# Patient Record
Sex: MALE | URBAN - METROPOLITAN AREA
[De-identification: names, ages, dates, MRNs, and addresses within clinical notes are randomized per-mention and may not be internally consistent; named-entity substitution may affect disease eponyms.]

---

## 2019-01-01 ENCOUNTER — INPATIENT (INPATIENT)
Facility: HOSPITAL | Age: 64
LOS: 3 days | DRG: 64 | End: 2019-08-16
Attending: NEUROLOGICAL SURGERY | Admitting: NEUROLOGICAL SURGERY
Payer: MEDICAID

## 2019-01-01 VITALS
TEMPERATURE: 93 F | OXYGEN SATURATION: 100 % | RESPIRATION RATE: 17 BRPM | SYSTOLIC BLOOD PRESSURE: 132 MMHG | DIASTOLIC BLOOD PRESSURE: 75 MMHG | HEART RATE: 77 BPM

## 2019-01-01 DIAGNOSIS — I61.9 NONTRAUMATIC INTRACEREBRAL HEMORRHAGE, UNSPECIFIED: ICD-10-CM

## 2019-01-01 DIAGNOSIS — R53.2 FUNCTIONAL QUADRIPLEGIA: ICD-10-CM

## 2019-01-01 DIAGNOSIS — Z51.5 ENCOUNTER FOR PALLIATIVE CARE: ICD-10-CM

## 2019-01-01 DIAGNOSIS — J96.00 ACUTE RESPIRATORY FAILURE, UNSPECIFIED WHETHER WITH HYPOXIA OR HYPERCAPNIA: ICD-10-CM

## 2019-01-01 DIAGNOSIS — Z71.89 OTHER SPECIFIED COUNSELING: ICD-10-CM

## 2019-01-01 LAB
ALBUMIN SERPL ELPH-MCNC: 3.3 G/DL — SIGNIFICANT CHANGE UP (ref 3.3–5)
ALBUMIN SERPL ELPH-MCNC: 3.6 G/DL — SIGNIFICANT CHANGE UP (ref 3.3–5)
ALP SERPL-CCNC: 27 U/L — LOW (ref 40–120)
ALP SERPL-CCNC: 32 U/L — LOW (ref 40–120)
ALT FLD-CCNC: 24 U/L — SIGNIFICANT CHANGE UP (ref 10–45)
ALT FLD-CCNC: 26 U/L — SIGNIFICANT CHANGE UP (ref 10–45)
ANION GAP SERPL CALC-SCNC: 10 MMOL/L — SIGNIFICANT CHANGE UP (ref 5–17)
ANION GAP SERPL CALC-SCNC: 11 MMOL/L — SIGNIFICANT CHANGE UP (ref 5–17)
ANION GAP SERPL CALC-SCNC: 11 MMOL/L — SIGNIFICANT CHANGE UP (ref 5–17)
ANION GAP SERPL CALC-SCNC: 12 MMOL/L — SIGNIFICANT CHANGE UP (ref 5–17)
ANION GAP SERPL CALC-SCNC: 12 MMOL/L — SIGNIFICANT CHANGE UP (ref 5–17)
ANION GAP SERPL CALC-SCNC: 13 MMOL/L — SIGNIFICANT CHANGE UP (ref 5–17)
ANION GAP SERPL CALC-SCNC: 14 MMOL/L — SIGNIFICANT CHANGE UP (ref 5–17)
ANION GAP SERPL CALC-SCNC: 14 MMOL/L — SIGNIFICANT CHANGE UP (ref 5–17)
ANION GAP SERPL CALC-SCNC: 17 MMOL/L — SIGNIFICANT CHANGE UP (ref 5–17)
ANION GAP SERPL CALC-SCNC: 18 MMOL/L — HIGH (ref 5–17)
ANION GAP SERPL CALC-SCNC: 7 MMOL/L — SIGNIFICANT CHANGE UP (ref 5–17)
ANION GAP SERPL CALC-SCNC: 8 MMOL/L — SIGNIFICANT CHANGE UP (ref 5–17)
ANION GAP SERPL CALC-SCNC: 9 MMOL/L — SIGNIFICANT CHANGE UP (ref 5–17)
ANISOCYTOSIS BLD QL: SLIGHT — SIGNIFICANT CHANGE UP
APTT BLD: 32.2 SEC — SIGNIFICANT CHANGE UP (ref 27.5–36.3)
AST SERPL-CCNC: 30 U/L — SIGNIFICANT CHANGE UP (ref 10–40)
AST SERPL-CCNC: 49 U/L — HIGH (ref 10–40)
BASE EXCESS BLDA CALC-SCNC: -3.7 MMOL/L — LOW (ref -2–2)
BASE EXCESS BLDA CALC-SCNC: -6.4 MMOL/L — LOW (ref -2–2)
BASE EXCESS BLDV CALC-SCNC: -6.8 MMOL/L — LOW (ref -2–2)
BASOPHILS # BLD AUTO: 0.2 K/UL — SIGNIFICANT CHANGE UP (ref 0–0.2)
BILIRUB SERPL-MCNC: 0.4 MG/DL — SIGNIFICANT CHANGE UP (ref 0.2–1.2)
BILIRUB SERPL-MCNC: 0.7 MG/DL — SIGNIFICANT CHANGE UP (ref 0.2–1.2)
BLD GP AB SCN SERPL QL: NEGATIVE — SIGNIFICANT CHANGE UP
BUN SERPL-MCNC: 10 MG/DL — SIGNIFICANT CHANGE UP (ref 7–23)
BUN SERPL-MCNC: 13 MG/DL — SIGNIFICANT CHANGE UP (ref 7–23)
BUN SERPL-MCNC: 14 MG/DL — SIGNIFICANT CHANGE UP (ref 7–23)
BUN SERPL-MCNC: 15 MG/DL — SIGNIFICANT CHANGE UP (ref 7–23)
BUN SERPL-MCNC: 16 MG/DL — SIGNIFICANT CHANGE UP (ref 7–23)
BUN SERPL-MCNC: 18 MG/DL — SIGNIFICANT CHANGE UP (ref 7–23)
BUN SERPL-MCNC: 19 MG/DL — SIGNIFICANT CHANGE UP (ref 7–23)
BUN SERPL-MCNC: 22 MG/DL — SIGNIFICANT CHANGE UP (ref 7–23)
BUN SERPL-MCNC: 23 MG/DL — SIGNIFICANT CHANGE UP (ref 7–23)
BUN SERPL-MCNC: 23 MG/DL — SIGNIFICANT CHANGE UP (ref 7–23)
BUN SERPL-MCNC: 24 MG/DL — HIGH (ref 7–23)
BUN SERPL-MCNC: 25 MG/DL — HIGH (ref 7–23)
CA-I SERPL-SCNC: 1.16 MMOL/L — SIGNIFICANT CHANGE UP (ref 1.12–1.3)
CALCIUM SERPL-MCNC: 5.9 MG/DL — CRITICAL LOW (ref 8.4–10.5)
CALCIUM SERPL-MCNC: 7.8 MG/DL — LOW (ref 8.4–10.5)
CALCIUM SERPL-MCNC: 8.2 MG/DL — LOW (ref 8.4–10.5)
CALCIUM SERPL-MCNC: 8.2 MG/DL — LOW (ref 8.4–10.5)
CALCIUM SERPL-MCNC: 8.3 MG/DL — LOW (ref 8.4–10.5)
CALCIUM SERPL-MCNC: 8.4 MG/DL — SIGNIFICANT CHANGE UP (ref 8.4–10.5)
CALCIUM SERPL-MCNC: 8.4 MG/DL — SIGNIFICANT CHANGE UP (ref 8.4–10.5)
CALCIUM SERPL-MCNC: 8.8 MG/DL — SIGNIFICANT CHANGE UP (ref 8.4–10.5)
CALCIUM SERPL-MCNC: 8.9 MG/DL — SIGNIFICANT CHANGE UP (ref 8.4–10.5)
CALCIUM SERPL-MCNC: 8.9 MG/DL — SIGNIFICANT CHANGE UP (ref 8.4–10.5)
CALCIUM SERPL-MCNC: 9 MG/DL — SIGNIFICANT CHANGE UP (ref 8.4–10.5)
CALCIUM SERPL-MCNC: 9.2 MG/DL — SIGNIFICANT CHANGE UP (ref 8.4–10.5)
CALCIUM SERPL-MCNC: 9.3 MG/DL — SIGNIFICANT CHANGE UP (ref 8.4–10.5)
CHLORIDE BLDV-SCNC: 113 MMOL/L — HIGH (ref 96–108)
CHLORIDE SERPL-SCNC: 107 MMOL/L — SIGNIFICANT CHANGE UP (ref 96–108)
CHLORIDE SERPL-SCNC: 108 MMOL/L — SIGNIFICANT CHANGE UP (ref 96–108)
CHLORIDE SERPL-SCNC: 121 MMOL/L — HIGH (ref 96–108)
CHLORIDE SERPL-SCNC: 122 MMOL/L — HIGH (ref 96–108)
CHLORIDE SERPL-SCNC: 123 MMOL/L — HIGH (ref 96–108)
CHLORIDE SERPL-SCNC: 126 MMOL/L — HIGH (ref 96–108)
CHLORIDE SERPL-SCNC: 127 MMOL/L — HIGH (ref 96–108)
CHLORIDE SERPL-SCNC: 129 MMOL/L — HIGH (ref 96–108)
CHLORIDE SERPL-SCNC: 132 MMOL/L — HIGH (ref 96–108)
CHLORIDE SERPL-SCNC: 133 MMOL/L — HIGH (ref 96–108)
CHLORIDE SERPL-SCNC: 133 MMOL/L — HIGH (ref 96–108)
CHLORIDE SERPL-SCNC: >135 MMOL/L — HIGH (ref 96–108)
CHLORIDE SERPL-SCNC: >135 MMOL/L — HIGH (ref 96–108)
CO2 BLDA-SCNC: 20 MMOL/L — LOW (ref 22–30)
CO2 BLDA-SCNC: 21 MMOL/L — LOW (ref 22–30)
CO2 BLDV-SCNC: 21 MMOL/L — LOW (ref 22–30)
CO2 SERPL-SCNC: 11 MMOL/L — LOW (ref 22–31)
CO2 SERPL-SCNC: 15 MMOL/L — LOW (ref 22–31)
CO2 SERPL-SCNC: 16 MMOL/L — LOW (ref 22–31)
CO2 SERPL-SCNC: 17 MMOL/L — LOW (ref 22–31)
CO2 SERPL-SCNC: 18 MMOL/L — LOW (ref 22–31)
CO2 SERPL-SCNC: 19 MMOL/L — LOW (ref 22–31)
CO2 SERPL-SCNC: 20 MMOL/L — LOW (ref 22–31)
CREAT SERPL-MCNC: 1.69 MG/DL — HIGH (ref 0.5–1.3)
CREAT SERPL-MCNC: 1.85 MG/DL — HIGH (ref 0.5–1.3)
CREAT SERPL-MCNC: 2.29 MG/DL — HIGH (ref 0.5–1.3)
CREAT SERPL-MCNC: 2.33 MG/DL — HIGH (ref 0.5–1.3)
CREAT SERPL-MCNC: 2.41 MG/DL — HIGH (ref 0.5–1.3)
CREAT SERPL-MCNC: 2.42 MG/DL — HIGH (ref 0.5–1.3)
CREAT SERPL-MCNC: 2.61 MG/DL — HIGH (ref 0.5–1.3)
CREAT SERPL-MCNC: 2.97 MG/DL — HIGH (ref 0.5–1.3)
CREAT SERPL-MCNC: 3.01 MG/DL — HIGH (ref 0.5–1.3)
CREAT SERPL-MCNC: 3.06 MG/DL — HIGH (ref 0.5–1.3)
CREAT SERPL-MCNC: 3.1 MG/DL — HIGH (ref 0.5–1.3)
CREAT SERPL-MCNC: 3.16 MG/DL — HIGH (ref 0.5–1.3)
CREAT SERPL-MCNC: 3.17 MG/DL — HIGH (ref 0.5–1.3)
CREAT SERPL-MCNC: 3.2 MG/DL — HIGH (ref 0.5–1.3)
CREAT SERPL-MCNC: 3.27 MG/DL — HIGH (ref 0.5–1.3)
EOSINOPHIL # BLD AUTO: 0 K/UL — SIGNIFICANT CHANGE UP (ref 0–0.5)
GAS PNL BLDV: 138 MMOL/L — SIGNIFICANT CHANGE UP (ref 135–145)
GAS PNL BLDV: SIGNIFICANT CHANGE UP
GAS PNL BLDV: SIGNIFICANT CHANGE UP
GLUCOSE BLDC GLUCOMTR-MCNC: 104 MG/DL — HIGH (ref 70–99)
GLUCOSE BLDC GLUCOMTR-MCNC: 105 MG/DL — HIGH (ref 70–99)
GLUCOSE BLDC GLUCOMTR-MCNC: 112 MG/DL — HIGH (ref 70–99)
GLUCOSE BLDC GLUCOMTR-MCNC: 120 MG/DL — HIGH (ref 70–99)
GLUCOSE BLDC GLUCOMTR-MCNC: 120 MG/DL — HIGH (ref 70–99)
GLUCOSE BLDC GLUCOMTR-MCNC: 123 MG/DL — HIGH (ref 70–99)
GLUCOSE BLDC GLUCOMTR-MCNC: 129 MG/DL — HIGH (ref 70–99)
GLUCOSE BLDC GLUCOMTR-MCNC: 134 MG/DL — HIGH (ref 70–99)
GLUCOSE BLDC GLUCOMTR-MCNC: 135 MG/DL — HIGH (ref 70–99)
GLUCOSE BLDC GLUCOMTR-MCNC: 140 MG/DL — HIGH (ref 70–99)
GLUCOSE BLDC GLUCOMTR-MCNC: 141 MG/DL — HIGH (ref 70–99)
GLUCOSE BLDC GLUCOMTR-MCNC: 141 MG/DL — HIGH (ref 70–99)
GLUCOSE BLDC GLUCOMTR-MCNC: 142 MG/DL — HIGH (ref 70–99)
GLUCOSE BLDC GLUCOMTR-MCNC: 151 MG/DL — HIGH (ref 70–99)
GLUCOSE BLDC GLUCOMTR-MCNC: 153 MG/DL — HIGH (ref 70–99)
GLUCOSE BLDC GLUCOMTR-MCNC: 155 MG/DL — HIGH (ref 70–99)
GLUCOSE BLDC GLUCOMTR-MCNC: 161 MG/DL — HIGH (ref 70–99)
GLUCOSE BLDC GLUCOMTR-MCNC: 162 MG/DL — HIGH (ref 70–99)
GLUCOSE BLDC GLUCOMTR-MCNC: 164 MG/DL — HIGH (ref 70–99)
GLUCOSE BLDC GLUCOMTR-MCNC: 165 MG/DL — HIGH (ref 70–99)
GLUCOSE BLDC GLUCOMTR-MCNC: 169 MG/DL — HIGH (ref 70–99)
GLUCOSE BLDC GLUCOMTR-MCNC: 174 MG/DL — HIGH (ref 70–99)
GLUCOSE BLDC GLUCOMTR-MCNC: 175 MG/DL — HIGH (ref 70–99)
GLUCOSE BLDC GLUCOMTR-MCNC: 176 MG/DL — HIGH (ref 70–99)
GLUCOSE BLDC GLUCOMTR-MCNC: 179 MG/DL — HIGH (ref 70–99)
GLUCOSE BLDC GLUCOMTR-MCNC: 180 MG/DL — HIGH (ref 70–99)
GLUCOSE BLDC GLUCOMTR-MCNC: 180 MG/DL — HIGH (ref 70–99)
GLUCOSE BLDC GLUCOMTR-MCNC: 182 MG/DL — HIGH (ref 70–99)
GLUCOSE BLDC GLUCOMTR-MCNC: 192 MG/DL — HIGH (ref 70–99)
GLUCOSE BLDC GLUCOMTR-MCNC: 192 MG/DL — HIGH (ref 70–99)
GLUCOSE BLDC GLUCOMTR-MCNC: 195 MG/DL — HIGH (ref 70–99)
GLUCOSE BLDC GLUCOMTR-MCNC: 200 MG/DL — HIGH (ref 70–99)
GLUCOSE BLDC GLUCOMTR-MCNC: 201 MG/DL — HIGH (ref 70–99)
GLUCOSE BLDC GLUCOMTR-MCNC: 208 MG/DL — HIGH (ref 70–99)
GLUCOSE BLDC GLUCOMTR-MCNC: 210 MG/DL — HIGH (ref 70–99)
GLUCOSE BLDC GLUCOMTR-MCNC: 227 MG/DL — HIGH (ref 70–99)
GLUCOSE BLDC GLUCOMTR-MCNC: 237 MG/DL — HIGH (ref 70–99)
GLUCOSE BLDC GLUCOMTR-MCNC: 87 MG/DL — SIGNIFICANT CHANGE UP (ref 70–99)
GLUCOSE BLDC GLUCOMTR-MCNC: 88 MG/DL — SIGNIFICANT CHANGE UP (ref 70–99)
GLUCOSE BLDC GLUCOMTR-MCNC: 92 MG/DL — SIGNIFICANT CHANGE UP (ref 70–99)
GLUCOSE BLDC GLUCOMTR-MCNC: 96 MG/DL — SIGNIFICANT CHANGE UP (ref 70–99)
GLUCOSE BLDV-MCNC: 385 MG/DL — HIGH (ref 70–99)
GLUCOSE SERPL-MCNC: 108 MG/DL — HIGH (ref 70–99)
GLUCOSE SERPL-MCNC: 119 MG/DL — HIGH (ref 70–99)
GLUCOSE SERPL-MCNC: 140 MG/DL — HIGH (ref 70–99)
GLUCOSE SERPL-MCNC: 143 MG/DL — HIGH (ref 70–99)
GLUCOSE SERPL-MCNC: 150 MG/DL — HIGH (ref 70–99)
GLUCOSE SERPL-MCNC: 154 MG/DL — HIGH (ref 70–99)
GLUCOSE SERPL-MCNC: 159 MG/DL — HIGH (ref 70–99)
GLUCOSE SERPL-MCNC: 188 MG/DL — HIGH (ref 70–99)
GLUCOSE SERPL-MCNC: 204 MG/DL — HIGH (ref 70–99)
GLUCOSE SERPL-MCNC: 206 MG/DL — HIGH (ref 70–99)
GLUCOSE SERPL-MCNC: 224 MG/DL — HIGH (ref 70–99)
GLUCOSE SERPL-MCNC: 254 MG/DL — HIGH (ref 70–99)
GLUCOSE SERPL-MCNC: 274 MG/DL — HIGH (ref 70–99)
GLUCOSE SERPL-MCNC: 397 MG/DL — HIGH (ref 70–99)
GLUCOSE SERPL-MCNC: 97 MG/DL — SIGNIFICANT CHANGE UP (ref 70–99)
HBA1C BLD-MCNC: 6 % — HIGH (ref 4–5.6)
HCO3 BLDA-SCNC: 18 MMOL/L — LOW (ref 21–29)
HCO3 BLDA-SCNC: 20 MMOL/L — LOW (ref 21–29)
HCO3 BLDV-SCNC: 20 MMOL/L — LOW (ref 21–29)
HCT VFR BLD CALC: 24.9 % — LOW (ref 39–50)
HCT VFR BLD CALC: 26.5 % — LOW (ref 39–50)
HCT VFR BLD CALC: 27.3 % — LOW (ref 39–50)
HCT VFR BLD CALC: 27.7 % — LOW (ref 39–50)
HCT VFR BLD CALC: 27.7 % — LOW (ref 39–50)
HCT VFR BLD CALC: 28 % — LOW (ref 39–50)
HCT VFR BLDA CALC: 27 % — LOW (ref 39–50)
HCV AB S/CO SERPL IA: 0.13 S/CO — SIGNIFICANT CHANGE UP (ref 0–0.99)
HCV AB SERPL-IMP: SIGNIFICANT CHANGE UP
HGB BLD CALC-MCNC: 8.8 G/DL — LOW (ref 13–17)
HGB BLD-MCNC: 7.5 G/DL — LOW (ref 13–17)
HGB BLD-MCNC: 7.6 G/DL — LOW (ref 13–17)
HGB BLD-MCNC: 8 G/DL — LOW (ref 13–17)
HGB BLD-MCNC: 8.8 G/DL — LOW (ref 13–17)
HGB BLD-MCNC: 8.8 G/DL — LOW (ref 13–17)
HGB BLD-MCNC: 9.1 G/DL — LOW (ref 13–17)
HOROWITZ INDEX BLDA+IHG-RTO: 40 — SIGNIFICANT CHANGE UP
HOROWITZ INDEX BLDA+IHG-RTO: 40 — SIGNIFICANT CHANGE UP
HYPOCHROMIA BLD QL: SIGNIFICANT CHANGE UP
INR BLD: 1 RATIO — SIGNIFICANT CHANGE UP (ref 0.88–1.16)
INR BLD: 1.08 RATIO — SIGNIFICANT CHANGE UP (ref 0.88–1.16)
INR BLD: 1.25 RATIO — HIGH (ref 0.88–1.16)
LACTATE BLDV-MCNC: 3.8 MMOL/L — HIGH (ref 0.7–2)
LYMPHOCYTES # BLD AUTO: 1.1 K/UL — SIGNIFICANT CHANGE UP (ref 1–3.3)
LYMPHOCYTES # BLD AUTO: 6 % — LOW (ref 13–44)
MAGNESIUM SERPL-MCNC: 1.5 MG/DL — LOW (ref 1.6–2.6)
MAGNESIUM SERPL-MCNC: 1.9 MG/DL — SIGNIFICANT CHANGE UP (ref 1.6–2.6)
MAGNESIUM SERPL-MCNC: 1.9 MG/DL — SIGNIFICANT CHANGE UP (ref 1.6–2.6)
MAGNESIUM SERPL-MCNC: 2.1 MG/DL — SIGNIFICANT CHANGE UP (ref 1.6–2.6)
MAGNESIUM SERPL-MCNC: 2.4 MG/DL — SIGNIFICANT CHANGE UP (ref 1.6–2.6)
MAGNESIUM SERPL-MCNC: 2.6 MG/DL — SIGNIFICANT CHANGE UP (ref 1.6–2.6)
MAGNESIUM SERPL-MCNC: 2.7 MG/DL — HIGH (ref 1.6–2.6)
MAGNESIUM SERPL-MCNC: 2.9 MG/DL — HIGH (ref 1.6–2.6)
MAGNESIUM SERPL-MCNC: 3 MG/DL — HIGH (ref 1.6–2.6)
MCHC RBC-ENTMCNC: 20.1 PG — LOW (ref 27–34)
MCHC RBC-ENTMCNC: 20.3 PG — LOW (ref 27–34)
MCHC RBC-ENTMCNC: 21.8 PG — LOW (ref 27–34)
MCHC RBC-ENTMCNC: 22.3 PG — LOW (ref 27–34)
MCHC RBC-ENTMCNC: 22.5 PG — LOW (ref 27–34)
MCHC RBC-ENTMCNC: 23.1 PG — LOW (ref 27–34)
MCHC RBC-ENTMCNC: 28.2 GM/DL — LOW (ref 32–36)
MCHC RBC-ENTMCNC: 28.7 GM/DL — LOW (ref 32–36)
MCHC RBC-ENTMCNC: 30.7 GM/DL — LOW (ref 32–36)
MCHC RBC-ENTMCNC: 31.6 GM/DL — LOW (ref 32–36)
MCHC RBC-ENTMCNC: 31.7 GM/DL — LOW (ref 32–36)
MCHC RBC-ENTMCNC: 33.2 GM/DL — SIGNIFICANT CHANGE UP (ref 32–36)
MCV RBC AUTO: 69.6 FL — LOW (ref 80–100)
MCV RBC AUTO: 70.5 FL — LOW (ref 80–100)
MCV RBC AUTO: 70.7 FL — LOW (ref 80–100)
MCV RBC AUTO: 70.8 FL — LOW (ref 80–100)
MCV RBC AUTO: 71 FL — LOW (ref 80–100)
MCV RBC AUTO: 71.2 FL — LOW (ref 80–100)
MICROCYTES BLD QL: SLIGHT — SIGNIFICANT CHANGE UP
MONOCYTES # BLD AUTO: 0.6 K/UL — SIGNIFICANT CHANGE UP (ref 0–0.9)
MONOCYTES NFR BLD AUTO: 4 % — SIGNIFICANT CHANGE UP (ref 2–14)
NEUTROPHILS # BLD AUTO: 12.9 K/UL — HIGH (ref 1.8–7.4)
NEUTROPHILS NFR BLD AUTO: 85 % — HIGH (ref 43–77)
NEUTS BAND # BLD: 5 % — SIGNIFICANT CHANGE UP (ref 0–8)
OTHER CELLS CSF MANUAL: 12 ML/DL — LOW (ref 18–22)
PCO2 BLDA: 33 MMHG — SIGNIFICANT CHANGE UP (ref 32–46)
PCO2 BLDA: 36 MMHG — SIGNIFICANT CHANGE UP (ref 32–46)
PCO2 BLDV: 48 MMHG — SIGNIFICANT CHANGE UP (ref 35–50)
PH BLDA: 7.33 — LOW (ref 7.35–7.45)
PH BLDA: 7.4 — SIGNIFICANT CHANGE UP (ref 7.35–7.45)
PH BLDV: 7.24 — LOW (ref 7.35–7.45)
PHOSPHATE SERPL-MCNC: 0.8 MG/DL — CRITICAL LOW (ref 2.5–4.5)
PHOSPHATE SERPL-MCNC: 1 MG/DL — CRITICAL LOW (ref 2.5–4.5)
PHOSPHATE SERPL-MCNC: 1.5 MG/DL — LOW (ref 2.5–4.5)
PHOSPHATE SERPL-MCNC: 3.8 MG/DL — SIGNIFICANT CHANGE UP (ref 2.5–4.5)
PHOSPHATE SERPL-MCNC: 4.1 MG/DL — SIGNIFICANT CHANGE UP (ref 2.5–4.5)
PHOSPHATE SERPL-MCNC: 4.3 MG/DL — SIGNIFICANT CHANGE UP (ref 2.5–4.5)
PHOSPHATE SERPL-MCNC: 4.6 MG/DL — HIGH (ref 2.5–4.5)
PHOSPHATE SERPL-MCNC: 4.7 MG/DL — HIGH (ref 2.5–4.5)
PHOSPHATE SERPL-MCNC: 5.3 MG/DL — HIGH (ref 2.5–4.5)
PHOSPHATE SERPL-MCNC: 5.4 MG/DL — HIGH (ref 2.5–4.5)
PHOSPHATE SERPL-MCNC: 5.5 MG/DL — HIGH (ref 2.5–4.5)
PHOSPHATE SERPL-MCNC: 5.8 MG/DL — HIGH (ref 2.5–4.5)
PLAT MORPH BLD: NORMAL — SIGNIFICANT CHANGE UP
PLATELET # BLD AUTO: 206 K/UL — SIGNIFICANT CHANGE UP (ref 150–400)
PLATELET # BLD AUTO: 228 K/UL — SIGNIFICANT CHANGE UP (ref 150–400)
PLATELET # BLD AUTO: 257 K/UL — SIGNIFICANT CHANGE UP (ref 150–400)
PLATELET # BLD AUTO: 267 K/UL — SIGNIFICANT CHANGE UP (ref 150–400)
PLATELET # BLD AUTO: 275 K/UL — SIGNIFICANT CHANGE UP (ref 150–400)
PLATELET # BLD AUTO: 295 K/UL — SIGNIFICANT CHANGE UP (ref 150–400)
PO2 BLDA: 173 MMHG — HIGH (ref 74–108)
PO2 BLDA: 199 MMHG — HIGH (ref 74–108)
PO2 BLDV: 161 MMHG — HIGH (ref 25–45)
POIKILOCYTOSIS BLD QL AUTO: SLIGHT — SIGNIFICANT CHANGE UP
POTASSIUM BLDV-SCNC: 3.2 MMOL/L — LOW (ref 3.5–5.3)
POTASSIUM SERPL-MCNC: 3.3 MMOL/L — LOW (ref 3.5–5.3)
POTASSIUM SERPL-MCNC: 3.4 MMOL/L — LOW (ref 3.5–5.3)
POTASSIUM SERPL-MCNC: 3.7 MMOL/L — SIGNIFICANT CHANGE UP (ref 3.5–5.3)
POTASSIUM SERPL-MCNC: 4 MMOL/L — SIGNIFICANT CHANGE UP (ref 3.5–5.3)
POTASSIUM SERPL-MCNC: 4.1 MMOL/L — SIGNIFICANT CHANGE UP (ref 3.5–5.3)
POTASSIUM SERPL-MCNC: 4.2 MMOL/L — SIGNIFICANT CHANGE UP (ref 3.5–5.3)
POTASSIUM SERPL-MCNC: 4.2 MMOL/L — SIGNIFICANT CHANGE UP (ref 3.5–5.3)
POTASSIUM SERPL-MCNC: 4.4 MMOL/L — SIGNIFICANT CHANGE UP (ref 3.5–5.3)
POTASSIUM SERPL-MCNC: 4.8 MMOL/L — SIGNIFICANT CHANGE UP (ref 3.5–5.3)
POTASSIUM SERPL-MCNC: 5.4 MMOL/L — HIGH (ref 3.5–5.3)
POTASSIUM SERPL-MCNC: 5.6 MMOL/L — HIGH (ref 3.5–5.3)
POTASSIUM SERPL-MCNC: 5.8 MMOL/L — HIGH (ref 3.5–5.3)
POTASSIUM SERPL-MCNC: 8.6 MMOL/L — CRITICAL HIGH (ref 3.5–5.3)
POTASSIUM SERPL-SCNC: 3.3 MMOL/L — LOW (ref 3.5–5.3)
POTASSIUM SERPL-SCNC: 3.4 MMOL/L — LOW (ref 3.5–5.3)
POTASSIUM SERPL-SCNC: 3.7 MMOL/L — SIGNIFICANT CHANGE UP (ref 3.5–5.3)
POTASSIUM SERPL-SCNC: 4 MMOL/L — SIGNIFICANT CHANGE UP (ref 3.5–5.3)
POTASSIUM SERPL-SCNC: 4.1 MMOL/L — SIGNIFICANT CHANGE UP (ref 3.5–5.3)
POTASSIUM SERPL-SCNC: 4.2 MMOL/L — SIGNIFICANT CHANGE UP (ref 3.5–5.3)
POTASSIUM SERPL-SCNC: 4.2 MMOL/L — SIGNIFICANT CHANGE UP (ref 3.5–5.3)
POTASSIUM SERPL-SCNC: 4.4 MMOL/L — SIGNIFICANT CHANGE UP (ref 3.5–5.3)
POTASSIUM SERPL-SCNC: 4.8 MMOL/L — SIGNIFICANT CHANGE UP (ref 3.5–5.3)
POTASSIUM SERPL-SCNC: 5.4 MMOL/L — HIGH (ref 3.5–5.3)
POTASSIUM SERPL-SCNC: 5.6 MMOL/L — HIGH (ref 3.5–5.3)
POTASSIUM SERPL-SCNC: 5.8 MMOL/L — HIGH (ref 3.5–5.3)
POTASSIUM SERPL-SCNC: 8.6 MMOL/L — CRITICAL HIGH (ref 3.5–5.3)
PROT SERPL-MCNC: 6 G/DL — SIGNIFICANT CHANGE UP (ref 6–8.3)
PROT SERPL-MCNC: 6.3 G/DL — SIGNIFICANT CHANGE UP (ref 6–8.3)
PROTHROM AB SERPL-ACNC: 11.5 SEC — SIGNIFICANT CHANGE UP (ref 10–12.9)
PROTHROM AB SERPL-ACNC: 12.4 SEC — SIGNIFICANT CHANGE UP (ref 10–12.9)
PROTHROM AB SERPL-ACNC: 14.4 SEC — HIGH (ref 10–12.9)
RBC # BLD: 3.51 M/UL — LOW (ref 4.2–5.8)
RBC # BLD: 3.72 M/UL — LOW (ref 4.2–5.8)
RBC # BLD: 3.91 M/UL — LOW (ref 4.2–5.8)
RBC # BLD: 3.92 M/UL — LOW (ref 4.2–5.8)
RBC # BLD: 3.93 M/UL — LOW (ref 4.2–5.8)
RBC # BLD: 3.96 M/UL — LOW (ref 4.2–5.8)
RBC # FLD: 15.5 % — HIGH (ref 10.3–14.5)
RBC # FLD: 15.7 % — HIGH (ref 10.3–14.5)
RBC # FLD: 15.9 % — HIGH (ref 10.3–14.5)
RBC # FLD: 16.2 % — HIGH (ref 10.3–14.5)
RBC # FLD: 16.3 % — HIGH (ref 10.3–14.5)
RBC # FLD: 16.5 % — HIGH (ref 10.3–14.5)
RBC BLD AUTO: ABNORMAL
RH IG SCN BLD-IMP: POSITIVE — SIGNIFICANT CHANGE UP
RH IG SCN BLD-IMP: POSITIVE — SIGNIFICANT CHANGE UP
SAO2 % BLDA: 99 % — HIGH (ref 92–96)
SAO2 % BLDA: 99 % — HIGH (ref 92–96)
SAO2 % BLDV: 98 % — HIGH (ref 67–88)
SODIUM SERPL-SCNC: 143 MMOL/L — SIGNIFICANT CHANGE UP (ref 135–145)
SODIUM SERPL-SCNC: 144 MMOL/L — SIGNIFICANT CHANGE UP (ref 135–145)
SODIUM SERPL-SCNC: 149 MMOL/L — HIGH (ref 135–145)
SODIUM SERPL-SCNC: 149 MMOL/L — HIGH (ref 135–145)
SODIUM SERPL-SCNC: 151 MMOL/L — HIGH (ref 135–145)
SODIUM SERPL-SCNC: 152 MMOL/L — HIGH (ref 135–145)
SODIUM SERPL-SCNC: 152 MMOL/L — HIGH (ref 135–145)
SODIUM SERPL-SCNC: 153 MMOL/L — HIGH (ref 135–145)
SODIUM SERPL-SCNC: 154 MMOL/L — HIGH (ref 135–145)
SODIUM SERPL-SCNC: 157 MMOL/L — HIGH (ref 135–145)
SODIUM SERPL-SCNC: 160 MMOL/L — CRITICAL HIGH (ref 135–145)
SODIUM SERPL-SCNC: 160 MMOL/L — CRITICAL HIGH (ref 135–145)
SODIUM SERPL-SCNC: 162 MMOL/L — CRITICAL HIGH (ref 135–145)
SODIUM SERPL-SCNC: 163 MMOL/L — CRITICAL HIGH (ref 135–145)
SODIUM SERPL-SCNC: 167 MMOL/L — CRITICAL HIGH (ref 135–145)
TARGETS BLD QL SMEAR: SLIGHT — SIGNIFICANT CHANGE UP
TROPONIN T, HIGH SENSITIVITY RESULT: 45 NG/L — SIGNIFICANT CHANGE UP (ref 0–51)
WBC # BLD: 14.7 K/UL — HIGH (ref 3.8–10.5)
WBC # BLD: 14.8 K/UL — HIGH (ref 3.8–10.5)
WBC # BLD: 17.2 K/UL — HIGH (ref 3.8–10.5)
WBC # BLD: 17.7 K/UL — HIGH (ref 3.8–10.5)
WBC # BLD: 18.6 K/UL — HIGH (ref 3.8–10.5)
WBC # BLD: 18.8 K/UL — HIGH (ref 3.8–10.5)
WBC # FLD AUTO: 14.7 K/UL — HIGH (ref 3.8–10.5)
WBC # FLD AUTO: 14.8 K/UL — HIGH (ref 3.8–10.5)
WBC # FLD AUTO: 17.2 K/UL — HIGH (ref 3.8–10.5)
WBC # FLD AUTO: 17.7 K/UL — HIGH (ref 3.8–10.5)
WBC # FLD AUTO: 18.6 K/UL — HIGH (ref 3.8–10.5)
WBC # FLD AUTO: 18.8 K/UL — HIGH (ref 3.8–10.5)

## 2019-01-01 PROCEDURE — 99233 SBSQ HOSP IP/OBS HIGH 50: CPT

## 2019-01-01 PROCEDURE — 70450 CT HEAD/BRAIN W/O DYE: CPT

## 2019-01-01 PROCEDURE — 86803 HEPATITIS C AB TEST: CPT

## 2019-01-01 PROCEDURE — 80053 COMPREHEN METABOLIC PANEL: CPT

## 2019-01-01 PROCEDURE — 83036 HEMOGLOBIN GLYCOSYLATED A1C: CPT

## 2019-01-01 PROCEDURE — 71045 X-RAY EXAM CHEST 1 VIEW: CPT

## 2019-01-01 PROCEDURE — 93970 EXTREMITY STUDY: CPT

## 2019-01-01 PROCEDURE — 93010 ELECTROCARDIOGRAM REPORT: CPT

## 2019-01-01 PROCEDURE — 99291 CRITICAL CARE FIRST HOUR: CPT

## 2019-01-01 PROCEDURE — 82330 ASSAY OF CALCIUM: CPT

## 2019-01-01 PROCEDURE — 70450 CT HEAD/BRAIN W/O DYE: CPT | Mod: 26

## 2019-01-01 PROCEDURE — 80048 BASIC METABOLIC PNL TOTAL CA: CPT

## 2019-01-01 PROCEDURE — 82803 BLOOD GASES ANY COMBINATION: CPT

## 2019-01-01 PROCEDURE — 93005 ELECTROCARDIOGRAM TRACING: CPT

## 2019-01-01 PROCEDURE — 93970 EXTREMITY STUDY: CPT | Mod: 26

## 2019-01-01 PROCEDURE — 94002 VENT MGMT INPAT INIT DAY: CPT

## 2019-01-01 PROCEDURE — 85730 THROMBOPLASTIN TIME PARTIAL: CPT

## 2019-01-01 PROCEDURE — 84295 ASSAY OF SERUM SODIUM: CPT

## 2019-01-01 PROCEDURE — 85027 COMPLETE CBC AUTOMATED: CPT

## 2019-01-01 PROCEDURE — 99223 1ST HOSP IP/OBS HIGH 75: CPT

## 2019-01-01 PROCEDURE — 85014 HEMATOCRIT: CPT

## 2019-01-01 PROCEDURE — 99285 EMERGENCY DEPT VISIT HI MDM: CPT | Mod: 25

## 2019-01-01 PROCEDURE — 83735 ASSAY OF MAGNESIUM: CPT

## 2019-01-01 PROCEDURE — 71045 X-RAY EXAM CHEST 1 VIEW: CPT | Mod: 26

## 2019-01-01 PROCEDURE — 85610 PROTHROMBIN TIME: CPT

## 2019-01-01 PROCEDURE — 86900 BLOOD TYPING SEROLOGIC ABO: CPT

## 2019-01-01 PROCEDURE — 82435 ASSAY OF BLOOD CHLORIDE: CPT

## 2019-01-01 PROCEDURE — 96374 THER/PROPH/DIAG INJ IV PUSH: CPT

## 2019-01-01 PROCEDURE — 84484 ASSAY OF TROPONIN QUANT: CPT

## 2019-01-01 PROCEDURE — 86901 BLOOD TYPING SEROLOGIC RH(D): CPT

## 2019-01-01 PROCEDURE — 83605 ASSAY OF LACTIC ACID: CPT

## 2019-01-01 PROCEDURE — 84100 ASSAY OF PHOSPHORUS: CPT

## 2019-01-01 PROCEDURE — 86850 RBC ANTIBODY SCREEN: CPT

## 2019-01-01 PROCEDURE — 84132 ASSAY OF SERUM POTASSIUM: CPT

## 2019-01-01 PROCEDURE — 82947 ASSAY GLUCOSE BLOOD QUANT: CPT

## 2019-01-01 PROCEDURE — 82962 GLUCOSE BLOOD TEST: CPT

## 2019-01-01 PROCEDURE — 99497 ADVNCD CARE PLAN 30 MIN: CPT | Mod: 25

## 2019-01-01 PROCEDURE — 94003 VENT MGMT INPAT SUBQ DAY: CPT

## 2019-01-01 PROCEDURE — 99498 ADVNCD CARE PLAN ADDL 30 MIN: CPT | Mod: 25

## 2019-01-01 RX ORDER — INSULIN HUMAN 100 [IU]/ML
2 INJECTION, SOLUTION SUBCUTANEOUS
Qty: 100 | Refills: 0 | Status: DISCONTINUED | OUTPATIENT
Start: 2019-01-01 | End: 2019-01-01

## 2019-01-01 RX ORDER — PANTOPRAZOLE SODIUM 20 MG/1
40 TABLET, DELAYED RELEASE ORAL DAILY
Refills: 0 | Status: DISCONTINUED | OUTPATIENT
Start: 2019-01-01 | End: 2019-01-01

## 2019-01-01 RX ORDER — MAGNESIUM SULFATE 500 MG/ML
2 VIAL (ML) INJECTION ONCE
Refills: 0 | Status: COMPLETED | OUTPATIENT
Start: 2019-01-01 | End: 2019-01-01

## 2019-01-01 RX ORDER — PHENYLEPHRINE HYDROCHLORIDE 10 MG/ML
0.1 INJECTION INTRAVENOUS
Qty: 40 | Refills: 0 | Status: DISCONTINUED | OUTPATIENT
Start: 2019-01-01 | End: 2019-01-01

## 2019-01-01 RX ORDER — ACETAMINOPHEN 500 MG
650 TABLET ORAL EVERY 6 HOURS
Refills: 0 | Status: DISCONTINUED | OUTPATIENT
Start: 2019-01-01 | End: 2019-01-01

## 2019-01-01 RX ORDER — POTASSIUM CHLORIDE 20 MEQ
40 PACKET (EA) ORAL
Refills: 0 | Status: COMPLETED | OUTPATIENT
Start: 2019-01-01 | End: 2019-01-01

## 2019-01-01 RX ORDER — SODIUM CHLORIDE 9 MG/ML
1000 INJECTION, SOLUTION INTRAVENOUS
Refills: 0 | Status: DISCONTINUED | OUTPATIENT
Start: 2019-01-01 | End: 2019-01-01

## 2019-01-01 RX ORDER — PROTHROMBIN COMPLEX CONCENTRATE (HUMAN) 25.5; 16.5; 24; 22; 22; 26 [IU]/ML; [IU]/ML; [IU]/ML; [IU]/ML; [IU]/ML; [IU]/ML
1500 POWDER, FOR SOLUTION INTRAVENOUS ONCE
Refills: 0 | Status: COMPLETED | OUTPATIENT
Start: 2019-01-01 | End: 2019-01-01

## 2019-01-01 RX ORDER — CHLORHEXIDINE GLUCONATE 213 G/1000ML
15 SOLUTION TOPICAL EVERY 12 HOURS
Refills: 0 | Status: DISCONTINUED | OUTPATIENT
Start: 2019-01-01 | End: 2019-01-01

## 2019-01-01 RX ORDER — POTASSIUM PHOSPHATE, MONOBASIC POTASSIUM PHOSPHATE, DIBASIC 236; 224 MG/ML; MG/ML
30 INJECTION, SOLUTION INTRAVENOUS ONCE
Refills: 0 | Status: COMPLETED | OUTPATIENT
Start: 2019-01-01 | End: 2019-01-01

## 2019-01-01 RX ORDER — GLUCAGON INJECTION, SOLUTION 0.5 MG/.1ML
1 INJECTION, SOLUTION SUBCUTANEOUS ONCE
Refills: 0 | Status: DISCONTINUED | OUTPATIENT
Start: 2019-01-01 | End: 2019-01-01

## 2019-01-01 RX ORDER — HYDROMORPHONE HYDROCHLORIDE 2 MG/ML
0.5 INJECTION INTRAMUSCULAR; INTRAVENOUS; SUBCUTANEOUS
Refills: 0 | Status: DISCONTINUED | OUTPATIENT
Start: 2019-01-01 | End: 2019-01-01

## 2019-01-01 RX ORDER — NICARDIPINE HYDROCHLORIDE 30 MG/1
5 CAPSULE, EXTENDED RELEASE ORAL
Qty: 40 | Refills: 0 | Status: DISCONTINUED | OUTPATIENT
Start: 2019-01-01 | End: 2019-01-01

## 2019-01-01 RX ORDER — DEXTROSE MONOHYDRATE, SODIUM CHLORIDE, AND POTASSIUM CHLORIDE 50; .745; 4.5 G/1000ML; G/1000ML; G/1000ML
1000 INJECTION, SOLUTION INTRAVENOUS
Refills: 0 | Status: DISCONTINUED | OUTPATIENT
Start: 2019-01-01 | End: 2019-01-01

## 2019-01-01 RX ORDER — SODIUM CHLORIDE 9 MG/ML
1000 INJECTION INTRAMUSCULAR; INTRAVENOUS; SUBCUTANEOUS ONCE
Refills: 0 | Status: COMPLETED | OUTPATIENT
Start: 2019-01-01 | End: 2019-01-01

## 2019-01-01 RX ORDER — DEXTROSE 50 % IN WATER 50 %
25 SYRINGE (ML) INTRAVENOUS ONCE
Refills: 0 | Status: DISCONTINUED | OUTPATIENT
Start: 2019-01-01 | End: 2019-01-01

## 2019-01-01 RX ORDER — SENNA PLUS 8.6 MG/1
2 TABLET ORAL AT BEDTIME
Refills: 0 | Status: DISCONTINUED | OUTPATIENT
Start: 2019-01-01 | End: 2019-01-01

## 2019-01-01 RX ORDER — CHLORHEXIDINE GLUCONATE 213 G/1000ML
1 SOLUTION TOPICAL
Refills: 0 | Status: DISCONTINUED | OUTPATIENT
Start: 2019-01-01 | End: 2019-01-01

## 2019-01-01 RX ORDER — ROBINUL 0.2 MG/ML
0.4 INJECTION INTRAMUSCULAR; INTRAVENOUS EVERY 6 HOURS
Refills: 0 | Status: DISCONTINUED | OUTPATIENT
Start: 2019-01-01 | End: 2019-01-01

## 2019-01-01 RX ORDER — PHENYLEPHRINE HYDROCHLORIDE 10 MG/ML
0.1 INJECTION INTRAVENOUS
Qty: 160 | Refills: 0 | Status: DISCONTINUED | OUTPATIENT
Start: 2019-01-01 | End: 2019-01-01

## 2019-01-01 RX ORDER — ROBINUL 0.2 MG/ML
0.4 INJECTION INTRAMUSCULAR; INTRAVENOUS ONCE
Refills: 0 | Status: COMPLETED | OUTPATIENT
Start: 2019-01-01 | End: 2019-01-01

## 2019-01-01 RX ORDER — FUROSEMIDE 40 MG
10 TABLET ORAL ONCE
Refills: 0 | Status: COMPLETED | OUTPATIENT
Start: 2019-01-01 | End: 2019-01-01

## 2019-01-01 RX ORDER — INSULIN LISPRO 100/ML
VIAL (ML) SUBCUTANEOUS EVERY 6 HOURS
Refills: 0 | Status: DISCONTINUED | OUTPATIENT
Start: 2019-01-01 | End: 2019-01-01

## 2019-01-01 RX ORDER — HYDROMORPHONE HYDROCHLORIDE 2 MG/ML
0.5 INJECTION INTRAMUSCULAR; INTRAVENOUS; SUBCUTANEOUS ONCE
Refills: 0 | Status: DISCONTINUED | OUTPATIENT
Start: 2019-01-01 | End: 2019-01-01

## 2019-01-01 RX ORDER — PROPOFOL 10 MG/ML
10 INJECTION, EMULSION INTRAVENOUS
Qty: 1000 | Refills: 0 | Status: DISCONTINUED | OUTPATIENT
Start: 2019-01-01 | End: 2019-01-01

## 2019-01-01 RX ORDER — DOCUSATE SODIUM 100 MG
100 CAPSULE ORAL THREE TIMES A DAY
Refills: 0 | Status: DISCONTINUED | OUTPATIENT
Start: 2019-01-01 | End: 2019-01-01

## 2019-01-01 RX ORDER — PROPOFOL 10 MG/ML
10 INJECTION, EMULSION INTRAVENOUS
Qty: 500 | Refills: 0 | Status: DISCONTINUED | OUTPATIENT
Start: 2019-01-01 | End: 2019-01-01

## 2019-01-01 RX ORDER — DEXTROSE 50 % IN WATER 50 %
12.5 SYRINGE (ML) INTRAVENOUS ONCE
Refills: 0 | Status: DISCONTINUED | OUTPATIENT
Start: 2019-01-01 | End: 2019-01-01

## 2019-01-01 RX ORDER — CALCIUM GLUCONATE 100 MG/ML
1 VIAL (ML) INTRAVENOUS ONCE
Refills: 0 | Status: COMPLETED | OUTPATIENT
Start: 2019-01-01 | End: 2019-01-01

## 2019-01-01 RX ORDER — PHENYLEPHRINE HYDROCHLORIDE 10 MG/ML
2 INJECTION INTRAVENOUS
Qty: 40 | Refills: 0 | Status: DISCONTINUED | OUTPATIENT
Start: 2019-01-01 | End: 2019-01-01

## 2019-01-01 RX ORDER — DEXTROSE 50 % IN WATER 50 %
15 SYRINGE (ML) INTRAVENOUS ONCE
Refills: 0 | Status: DISCONTINUED | OUTPATIENT
Start: 2019-01-01 | End: 2019-01-01

## 2019-01-01 RX ADMIN — PHENYLEPHRINE HYDROCHLORIDE 1.99 MICROGRAM(S)/KG/MIN: 10 INJECTION INTRAVENOUS at 22:22

## 2019-01-01 RX ADMIN — PANTOPRAZOLE SODIUM 40 MILLIGRAM(S): 20 TABLET, DELAYED RELEASE ORAL at 11:27

## 2019-01-01 RX ADMIN — Medication 6: at 21:01

## 2019-01-01 RX ADMIN — Medication 10 MILLIGRAM(S): at 11:27

## 2019-01-01 RX ADMIN — CHLORHEXIDINE GLUCONATE 15 MILLILITER(S): 213 SOLUTION TOPICAL at 05:01

## 2019-01-01 RX ADMIN — PHENYLEPHRINE HYDROCHLORIDE 1.99 MICROGRAM(S)/KG/MIN: 10 INJECTION INTRAVENOUS at 18:25

## 2019-01-01 RX ADMIN — Medication 6: at 22:13

## 2019-01-01 RX ADMIN — POTASSIUM PHOSPHATE, MONOBASIC POTASSIUM PHOSPHATE, DIBASIC 83.33 MILLIMOLE(S): 236; 224 INJECTION, SOLUTION INTRAVENOUS at 05:49

## 2019-01-01 RX ADMIN — INSULIN HUMAN 2 UNIT(S)/HR: 100 INJECTION, SOLUTION SUBCUTANEOUS at 08:19

## 2019-01-01 RX ADMIN — PROPOFOL 4.2 MICROGRAM(S)/KG/MIN: 10 INJECTION, EMULSION INTRAVENOUS at 12:54

## 2019-01-01 RX ADMIN — CHLORHEXIDINE GLUCONATE 15 MILLILITER(S): 213 SOLUTION TOPICAL at 18:09

## 2019-01-01 RX ADMIN — CHLORHEXIDINE GLUCONATE 1 APPLICATION(S): 213 SOLUTION TOPICAL at 22:12

## 2019-01-01 RX ADMIN — SODIUM CHLORIDE 100 MILLILITER(S): 9 INJECTION, SOLUTION INTRAVENOUS at 18:12

## 2019-01-01 RX ADMIN — SODIUM CHLORIDE 100 MILLILITER(S): 9 INJECTION, SOLUTION INTRAVENOUS at 11:27

## 2019-01-01 RX ADMIN — PANTOPRAZOLE SODIUM 40 MILLIGRAM(S): 20 TABLET, DELAYED RELEASE ORAL at 12:05

## 2019-01-01 RX ADMIN — CHLORHEXIDINE GLUCONATE 15 MILLILITER(S): 213 SOLUTION TOPICAL at 18:12

## 2019-01-01 RX ADMIN — Medication 2: at 05:48

## 2019-01-01 RX ADMIN — PROTHROMBIN COMPLEX CONCENTRATE (HUMAN) 400 INTERNATIONAL UNIT(S): 25.5; 16.5; 24; 22; 22; 26 POWDER, FOR SOLUTION INTRAVENOUS at 11:57

## 2019-01-01 RX ADMIN — PANTOPRAZOLE SODIUM 40 MILLIGRAM(S): 20 TABLET, DELAYED RELEASE ORAL at 22:12

## 2019-01-01 RX ADMIN — CHLORHEXIDINE GLUCONATE 15 MILLILITER(S): 213 SOLUTION TOPICAL at 05:11

## 2019-01-01 RX ADMIN — DEXTROSE MONOHYDRATE, SODIUM CHLORIDE, AND POTASSIUM CHLORIDE 50 MILLILITER(S): 50; .745; 4.5 INJECTION, SOLUTION INTRAVENOUS at 17:40

## 2019-01-01 RX ADMIN — ROBINUL 0.4 MILLIGRAM(S): 0.2 INJECTION INTRAMUSCULAR; INTRAVENOUS at 12:58

## 2019-01-01 RX ADMIN — PANTOPRAZOLE SODIUM 40 MILLIGRAM(S): 20 TABLET, DELAYED RELEASE ORAL at 12:25

## 2019-01-01 RX ADMIN — INSULIN HUMAN 2 UNIT(S)/HR: 100 INJECTION, SOLUTION SUBCUTANEOUS at 19:47

## 2019-01-01 RX ADMIN — CHLORHEXIDINE GLUCONATE 15 MILLILITER(S): 213 SOLUTION TOPICAL at 17:40

## 2019-01-01 RX ADMIN — Medication 4: at 14:44

## 2019-01-01 RX ADMIN — PHENYLEPHRINE HYDROCHLORIDE 1.99 MICROGRAM(S)/KG/MIN: 10 INJECTION INTRAVENOUS at 08:19

## 2019-01-01 RX ADMIN — DEXTROSE MONOHYDRATE, SODIUM CHLORIDE, AND POTASSIUM CHLORIDE 50 MILLILITER(S): 50; .745; 4.5 INJECTION, SOLUTION INTRAVENOUS at 18:09

## 2019-01-01 RX ADMIN — Medication 40 MILLIEQUIVALENT(S): at 22:23

## 2019-01-01 RX ADMIN — Medication 40 MILLIEQUIVALENT(S): at 23:57

## 2019-01-01 RX ADMIN — SODIUM CHLORIDE 1000 MILLILITER(S): 9 INJECTION INTRAMUSCULAR; INTRAVENOUS; SUBCUTANEOUS at 04:18

## 2019-01-01 RX ADMIN — CHLORHEXIDINE GLUCONATE 15 MILLILITER(S): 213 SOLUTION TOPICAL at 05:07

## 2019-01-01 RX ADMIN — PHENYLEPHRINE HYDROCHLORIDE 1.99 MICROGRAM(S)/KG/MIN: 10 INJECTION INTRAVENOUS at 11:27

## 2019-01-01 RX ADMIN — INSULIN HUMAN 2 UNIT(S)/HR: 100 INJECTION, SOLUTION SUBCUTANEOUS at 22:21

## 2019-01-01 RX ADMIN — Medication 2: at 05:01

## 2019-01-01 RX ADMIN — Medication 0.5 MILLIGRAM(S): at 12:59

## 2019-01-01 RX ADMIN — HYDROMORPHONE HYDROCHLORIDE 0.5 MILLIGRAM(S): 2 INJECTION INTRAMUSCULAR; INTRAVENOUS; SUBCUTANEOUS at 13:00

## 2019-01-01 RX ADMIN — CHLORHEXIDINE GLUCONATE 1 APPLICATION(S): 213 SOLUTION TOPICAL at 05:48

## 2019-01-01 RX ADMIN — SODIUM CHLORIDE 100 MILLILITER(S): 9 INJECTION, SOLUTION INTRAVENOUS at 21:11

## 2019-01-01 RX ADMIN — CHLORHEXIDINE GLUCONATE 15 MILLILITER(S): 213 SOLUTION TOPICAL at 05:48

## 2019-01-01 RX ADMIN — PHENYLEPHRINE HYDROCHLORIDE 1.99 MICROGRAM(S)/KG/MIN: 10 INJECTION INTRAVENOUS at 05:11

## 2019-01-01 RX ADMIN — Medication 50 GRAM(S): at 22:47

## 2019-01-01 RX ADMIN — SODIUM CHLORIDE 100 MILLILITER(S): 9 INJECTION, SOLUTION INTRAVENOUS at 08:20

## 2019-01-01 RX ADMIN — CHLORHEXIDINE GLUCONATE 15 MILLILITER(S): 213 SOLUTION TOPICAL at 18:23

## 2019-01-01 RX ADMIN — PHENYLEPHRINE HYDROCHLORIDE 1.99 MICROGRAM(S)/KG/MIN: 10 INJECTION INTRAVENOUS at 19:47

## 2019-01-01 RX ADMIN — INSULIN HUMAN 2 UNIT(S)/HR: 100 INJECTION, SOLUTION SUBCUTANEOUS at 18:46

## 2019-01-01 RX ADMIN — SODIUM CHLORIDE 100 MILLILITER(S): 9 INJECTION, SOLUTION INTRAVENOUS at 07:46

## 2019-01-01 RX ADMIN — PANTOPRAZOLE SODIUM 40 MILLIGRAM(S): 20 TABLET, DELAYED RELEASE ORAL at 12:03

## 2019-01-01 RX ADMIN — Medication 200 GRAM(S): at 00:13

## 2019-01-01 RX ADMIN — PHENYLEPHRINE HYDROCHLORIDE 1.99 MICROGRAM(S)/KG/MIN: 10 INJECTION INTRAVENOUS at 18:12

## 2019-01-01 RX ADMIN — PHENYLEPHRINE HYDROCHLORIDE 1.99 MICROGRAM(S)/KG/MIN: 10 INJECTION INTRAVENOUS at 07:45

## 2019-01-01 RX ADMIN — SODIUM CHLORIDE 100 MILLILITER(S): 9 INJECTION, SOLUTION INTRAVENOUS at 19:47

## 2019-01-01 RX ADMIN — Medication 4: at 12:25

## 2019-01-01 RX ADMIN — Medication 50 GRAM(S): at 22:23

## 2019-01-01 RX ADMIN — CHLORHEXIDINE GLUCONATE 1 APPLICATION(S): 213 SOLUTION TOPICAL at 21:08

## 2019-01-01 RX ADMIN — CHLORHEXIDINE GLUCONATE 1 APPLICATION(S): 213 SOLUTION TOPICAL at 22:22

## 2019-01-01 RX ADMIN — SODIUM CHLORIDE 100 MILLILITER(S): 9 INJECTION, SOLUTION INTRAVENOUS at 22:22

## 2019-08-12 NOTE — ED PROVIDER NOTE - PMH
Atrial fibrillation    CVA (cerebral vascular accident)    DM2 (diabetes mellitus, type 2)    HLD (hyperlipidemia)    HTN (hypertension)

## 2019-08-12 NOTE — H&P ADULT - ASSESSMENT
64M hx CVA, HTN, HLD, afib on xarelto, dm2, transferred from [facility]. Found to have devastating R ICH. Repeat CT grossly stable.  Exam: Intubated, L pupil 2 fixed, R pupil 3 fixed. +gag, +corneal, w/d x4  - ICH score 4 (97% mortality)  - On xaralto, s/p VitK, Kcentra ordered  - Repeat CT grossly stable  - Admit to NSCU under Dr. Dawkins  - SBP <160  - No acute neurosurgical intervention  - Consult palliative for GOC

## 2019-08-12 NOTE — PROGRESS NOTE ADULT - SUBJECTIVE AND OBJECTIVE BOX
Daytime Events:   admitted to NSCU  exam devastating     VITAL SIGNS:  Reviewed   IMAGING: Reviewed  MEDICATIONS:  Reviewed     Physical Exam:  EXAMINATION:    General:  intubated   HEENT:  MMM  Neuro:  Intubated, no EO, no FC.  L pupil 2 fixed, R pupil 3 fixed.  +gag, +corneal, w/d x4  Cards:  RRR  Respiratory:  no respiratory distress  Abdomen  soft  Extremities:  no edema  Skin:  warm/dry

## 2019-08-12 NOTE — PROVIDER CONTACT NOTE (OTHER) - BACKGROUND
Patient tx from [facility] for R IPH with MLS. H/O DM2.BMP drawn at 1138, serum glucose was 397 with no apparent intervention. BMP drawn at 2126 with serum glucose 274.Pt transferred to SICU @ 2020

## 2019-08-12 NOTE — ED ADULT NURSE NOTE - OBJECTIVE STATEMENT
64 year old male presents to ED from [facility]. Per EMS patient resides full time at a nursing facility for deficits after a CVA. PMH afib on xarelto, CVA HTN, HLD DM2. EMS states that the nursing home found him down this morning and unresponsive, last seen well yesterday evening. Patient found to have right cerebral intraparenchymal hemorrhage with midline shift. Intubated at [facility], breath sounds clear and equal bilaterally, 20 at lip line, etco2 27, 100% o2 saturation. Patient withdraws to painful stimuli, pupu    21 at lip 64 year old male, withdrawing to painful stimuli, transferred  from [facility]. Per EMS patient resides full time at a nursing facility for rehab s/p CVA. PMH afib on xarelto, CVA HTN, HLD DM2. EMS states that the nursing home found him down this morning and unresponsive with a pulse, last seen well yesterday evening. Patient found to have right cerebral intraparenchymal hemorrhage with midline shift per report from [facility]. Intubated at [facility], breath sounds clear and equal bilaterally, 20 at lip line, etco2 27, 100% o2 saturation, RT at bedside. Patient presents with souza, given vitamin k, 1g of vanco and sedated on 5mcg/kg/min propofol PTA. No evidence of trauma or obvious deformities noted.  Geovani Haider MD at bedside. CM applied, EKG done. See neuro flow sheet for further information.

## 2019-08-12 NOTE — ED ADULT NURSE NOTE - GASTROINTESTINAL WDL
Abdomen soft, nontender, nondistended, bowel sounds present in all 4 quadrants. Artificial joint/right knee hardware, right ocular lens/Lens implant

## 2019-08-12 NOTE — H&P ADULT - NSICDXPASTMEDICALHX_GEN_ALL_CORE_FT
PAST MEDICAL HISTORY:  Atrial fibrillation     CVA (cerebral vascular accident)     DM2 (diabetes mellitus, type 2)     HLD (hyperlipidemia)     HTN (hypertension)

## 2019-08-12 NOTE — PROGRESS NOTE ADULT - ASSESSMENT
ASSESSMENT/PLAN:  ICH with IVH , ICH score 4 PBD #1    No neurosurgical intervention in the setting of anticoagulation   Poor prognosis,   GOC discussion with family   Palliative care consult   BP control with Cardene PRN   Resp Failure 2/2 ICH, c.w Full vent support     DISPOSITION:  [X] ICU [] Stroke Unit [] Floor [] EMU [] RCU [] PCU      Time spent: 35 [x] critical care minutes    Contact: [phone]

## 2019-08-12 NOTE — DISCHARGE NOTE NURSING/CASE MANAGEMENT/SOCIAL WORK - NSDCDPATPORTLINK_GEN_ALL_CORE
You can access the [facility], offered by [facility], by registering with the following website: [email or web address]

## 2019-08-12 NOTE — ED PROVIDER NOTE - PHYSICAL EXAMINATION
VITALS: reviewed  GEN: NAD  HEAD/EYES: NCAT, pinpoint pupils, anicteric sclerae  ENT: mucus membranes moist, ETT in place  RESP: coarse bs RUL, chest wall nontender and atraumatic  CV: heart with reg rhythm S1, S2, distal pulses intact and symmetric bilaterally  ABDOMEN: soft, nondistended, nontender, no palpable masses  : no CVAT  MSK: no edema, no asymmetry.   SKIN: warm, dry, no rash, no bruising, no cyanosis. color appropriate for ethnicity  NEURO: obtunded, no facial asymmetry.  PSYCH: Affect appropriate

## 2019-08-12 NOTE — DISCHARGE NOTE NURSING/CASE MANAGEMENT/SOCIAL WORK - NSDCPEPTSTRK_GEN_ALL_CORE
Stroke education booklet/Need for follow up after discharge/Stroke warning signs and symptoms/Signs and symptoms of stroke/Call 911 for stroke/Stroke support groups for patients, families, and friends/Prescribed medications/Risk factors for stroke

## 2019-08-12 NOTE — ED PROVIDER NOTE - OBJECTIVE STATEMENT
65 yo M hx CVA, HTN, HLD, afib on xarelto, dm2, transferred from [facility] for R cerebral intraparenchymal hemorrhage with midline shift. Patient resides at NH, last seen normal last night, found unresponsive in room this morning. Intubated at [facility], SBP initially>200 so cardene drip ordered however never given- bp dropped after propofol started. OSH CXR with ? RUL opacity.

## 2019-08-12 NOTE — PATIENT PROFILE ADULT - INFORMATION COULD NOT BE OBTAINED DETAILS
Pt received cefepime x9 days (missed one day) and vanc  Now afebrile with normal WBC so will de-escalate to moxifloxacin for 5 day course   Unable to assess. Pt is intubated

## 2019-08-12 NOTE — ED PROVIDER NOTE - ATTENDING CONTRIBUTION TO CARE
pt is a 63 y/o male who was transferred from [facility] for icb with midline shift, intubated, vss, on propofol drip, NS paged.  pt not posturing but unresponsive on xarelto given vit k only, to order kcentra here, pending dispo.

## 2019-08-12 NOTE — CHART NOTE - NSCHARTNOTEFT_GEN_A_CORE
CAPRINI SCORE [CLOT] Score on Admission for     AGE RELATED RISK FACTORS                                                       MOBILITY RELATED FACTORS  [ ] Age 41-60 years                                            (1 Point)                  [ ] Bed rest                                                        (1 Point)  [ X] Age: 61-74 years                                           (2 Points)                 [ ] Plaster cast                                                   (2 Points)  [ ] Age= 75 years                                              (3 Points)                 [ ] Bed bound for more than 72 hours                 (2 Points)    DISEASE RELATED RISK FACTORS                                               GENDER SPECIFIC FACTORS  [ ] Edema in the lower extremities                       (1 Point)                  [ ] Pregnancy                                                     (1 Point)  [ ] Varicose veins                                               (1 Point)                  [ ] Post-partum < 6 weeks                                   (1 Point)             [ ] BMI > 25 Kg/m2                                            (1 Point)                  [ ] Hormonal therapy  or oral contraception          (1 Point)                 [ ] Sepsis (in the previous month)                        (1 Point)                  [ ] History of pregnancy complications                 (1 point)  [ ] Pneumonia or serious lung disease                                               [ ] Unexplained or recurrent                     (1 Point)           (in the previous month)                               (1 Point)  [ ] Abnormal pulmonary function test                     (1 Point)                 SURGERY RELATED RISK FACTORS (include planned surgeries)  [ ] Acute myocardial infarction                              (1 Point)                 [ ]  Section                                             (1 Point)  [ ] Congestive heart failure (in the previous month)  (1 Point)         [ ] Minor surgery                                                  (1 Point)   [ ] Inflammatory bowel disease                             (1 Point)                 [ ] Arthroscopic surgery                                        (2 Points)  [ ] Central venous access                                      (2 Points)                [ ] General surgery lasting more than 45 minutes   (2 Points)       [ X] Stroke (in the previous month)                          (5 Points)               [ ] Elective arthroplasty                                         (5 Points)            [ ] current or past malignancy                              (2 Points)                                                                                                       HEMATOLOGY RELATED FACTORS                                                 TRAUMA RELATED RISK FACTORS  [ ] Prior episodes of VTE                                     (3 Points)                [ ] Fracture of the hip, pelvis, or leg                       (5 Points)  [ ] Positive family history for VTE                         (3 Points)                 [ ] Acute spinal cord injury (in the previous month)  (5 Points)  [ ] Prothrombin 69557 A                                     (3 Points)                 [ ] Paralysis  (less than 1 month)                             (5 Points)  [ ] Factor V Leiden                                             (3 Points)                  [ ] Multiple Trauma within 1 month                        (5 Points)  [ ] Lupus anticoagulants                                     (3 Points)                                                           [ ] Anticardiolipin antibodies                               (3 Points)                                                       [ ] High homocysteine in the blood                      (3 Points)                                             [ ] Other congenital or acquired thrombophilia      (3 Points)                                                [ ] Heparin induced thrombocytopenia                  (3 Points)                                          Total Score [      7  ]    Risk:  Very low 0   Low 1 to 2   Moderate 3 to 4   High =5       VTE Prophylasix Recommednations:  [ X] mechanical pneumatic compression devices                                      [ X] contraindicated: _____________________  [ ] chemo prophylasix                                                                                   [ ] contraindicated _____________________    **** HIGH LIKELIHOOD DVT PRESENT ON ADMISSION  [ ] (please order LE dopplers within 24 hours of admission)

## 2019-08-12 NOTE — ED ADULT NURSE NOTE - NSIMPLEMENTINTERV_GEN_ALL_ED
Implemented All Fall with Harm Risk Interventions:  [place] to call system. Call bell, personal items and telephone within reach. Instruct patient to call for assistance. Room bathroom lighting operational. Non-slip footwear when patient is off stretcher. Physically safe environment: no spills, clutter or unnecessary equipment. Stretcher in lowest position, wheels locked, appropriate side rails in place. Provide visual cue, wrist band, yellow gown, etc. Monitor gait and stability. Monitor for mental status changes and reorient to person, place, and time. Review medications for side effects contributing to fall risk. Reinforce activity limits and safety measures with patient and family. Provide visual clues: red socks.

## 2019-08-12 NOTE — H&P ADULT - HISTORY OF PRESENT ILLNESS
63 yo M hx CVA, HTN, HLD, afib on xarelto, dm2, transferred from [facility] for R cerebral intraparenchymal hemorrhage with midline shift. Patient resides at NH, last seen normal last night, found unresponsive in room this morning. Intubated at [facility]. Devastating ICH on initial CT.

## 2019-08-12 NOTE — PROGRESS NOTE ADULT - SUBJECTIVE AND OBJECTIVE BOX
HPI:  63 yo M hx CVA, HTN, HLD, afib on xarelto, dm2, transferred from [facility] for R cerebral intraparenchymal hemorrhage with midline shift. Patient resides at NH, last seen normal last night, found unresponsive in room this morning. Intubated at [facility]. Devastating ICH on initial CT. (12 Aug 2019 13:05)    On admission, the patient was:  GCS: 3  ICH score: 4    *** HIGH RISK OF DVT PRESENT ON ADMISSION ***    ICU Vital Signs Last 24 Hrs  T(C): 37.6 (12 Aug 2019 16:00), Max: 37.6 (12 Aug 2019 16:00)  T(F): 99.7 (12 Aug 2019 16:00), Max: 99.7 (12 Aug 2019 16:00)  HR: 75 (12 Aug 2019 16:15) (50 - 77)  BP: 149/68 (12 Aug 2019 16:00) (132/70 - 149/79)  BP(mean): 92 (12 Aug 2019 16:00) (88 - 98)  RR: 18 (12 Aug 2019 16:00) (12 - 18)  SpO2: 100% (12 Aug 2019 16:15) (100% - 100%)      LABS:                        8.8    14.8  )-----------( 267      ( 12 Aug 2019 11:38 )             27.7   08-12    143  |  107  |  25<H>  ----------------------------<  397<H>  3.4<L>   |  19<L>  |  1.69<H>    Ca    8.4      12 Aug 2019 11:38    TPro  6.0  /  Alb  3.6  /  TBili  0.7  /  DBili  x   /  AST  30  /  ALT  24  /  AlkPhos  27<L>  08-12    PT/INR - ( 12 Aug 2019 12:55 )   PT: 14.4 sec;   INR: 1.25 ratio         PTT - ( 12 Aug 2019 11:38 )  PTT:32.2 sec    Imaging :   [facility] 8/12:   Right 8.1 x 6.3 x 5.4 centers AP, TR, CC frontotemporal intraparenchymal,   subarachnoid, and intraventricular hemorrhage. Right uncal, and   subfalcine herniation, 2 cm leftward shift with trapped left lateral   ventricle. Mass effect, with sulcal effacement, and decreased attenuation   new ischemia not excluded. Intervertebral hemorrhage with hydrocephalus,   effaced quadrigeminal plate, leftward midbrain displacement effacing the   left ambient cistern and widening of the right ambient cistern.    MEDICATIONS  (STANDING):  chlorhexidine 0.12% Liquid 15 milliLiter(s) Oral Mucosa every 12 hours  docusate sodium 100 milliGRAM(s) Oral three times a day  niCARdipine Infusion 5 mG/Hr (25 mL/Hr) IV Continuous <Continuous>  pantoprazole  Injectable 40 milliGRAM(s) IV Push daily  propofol Infusion 10 MICROgram(s)/kG/Min (4.2 mL/Hr) IV Continuous <Continuous>  sodium chloride 0.9% with potassium chloride 20 mEq/L 1000 milliLiter(s) (50 mL/Hr) IV Continuous <Continuous>    MEDICATIONS  (PRN):  acetaminophen    Suspension .. 650 milliGRAM(s) Oral every 6 hours PRN Temp greater or equal to 38C (100.4F), Mild Pain (1 - 3)  senna 2 Tablet(s) Oral at bedtime PRN Constipation      EXAMINATION:  General:  calm  HEENT:  MMM  Neuro:  Intubated, L pupil 2 fixed, R pupil 3 fixed.  +gag, +corneal, w/d x4  Cards:  RRR  Respiratory:  no respiratory distress  Abdomen  soft  Extremities:  no edema  Skin:  warm/dry HPI:  63 yo M hx CVA, HTN, HLD, afib on xarelto, dm2, transferred from [facility] for R cerebral intraparenchymal hemorrhage with midline shift. Patient resides at NH, last seen normal last night, found unresponsive in room this morning. Intubated at [facility]. Devastating ICH on initial CT. (12 Aug 2019 13:05)    On admission, the patient was:  GCS: 3  ICH score: 4      ROS: unable to obtain due to patients mental status     ICU Vital Signs Last 24 Hrs  T(C): 37.6 (12 Aug 2019 16:00), Max: 37.6 (12 Aug 2019 16:00)  T(F): 99.7 (12 Aug 2019 16:00), Max: 99.7 (12 Aug 2019 16:00)  HR: 75 (12 Aug 2019 16:15) (50 - 77)  BP: 149/68 (12 Aug 2019 16:00) (132/70 - 149/79)  BP(mean): 92 (12 Aug 2019 16:00) (88 - 98)  RR: 18 (12 Aug 2019 16:00) (12 - 18)  SpO2: 100% (12 Aug 2019 16:15) (100% - 100%)      LABS:                        8.8    14.8  )-----------( 267      ( 12 Aug 2019 11:38 )             27.7   08-12    143  |  107  |  25<H>  ----------------------------<  397<H>  3.4<L>   |  19<L>  |  1.69<H>    Ca    8.4      12 Aug 2019 11:38    TPro  6.0  /  Alb  3.6  /  TBili  0.7  /  DBili  x   /  AST  30  /  ALT  24  /  AlkPhos  27<L>  08-12    PT/INR - ( 12 Aug 2019 12:55 )   PT: 14.4 sec;   INR: 1.25 ratio         PTT - ( 12 Aug 2019 11:38 )  PTT:32.2 sec    Imaging :   [facility] 8/12:   Right 8.1 x 6.3 x 5.4 centers AP, TR, CC frontotemporal intraparenchymal,   subarachnoid, and intraventricular hemorrhage. Right uncal, and   subfalcine herniation, 2 cm leftward shift with trapped left lateral   ventricle. Mass effect, with sulcal effacement, and decreased attenuation   new ischemia not excluded. Intervertebral hemorrhage with hydrocephalus,   effaced quadrigeminal plate, leftward midbrain displacement effacing the   left ambient cistern and widening of the right ambient cistern.    MEDICATIONS  (STANDING):  chlorhexidine 0.12% Liquid 15 milliLiter(s) Oral Mucosa every 12 hours  docusate sodium 100 milliGRAM(s) Oral three times a day  niCARdipine Infusion 5 mG/Hr (25 mL/Hr) IV Continuous <Continuous>  pantoprazole  Injectable 40 milliGRAM(s) IV Push daily  propofol Infusion 10 MICROgram(s)/kG/Min (4.2 mL/Hr) IV Continuous <Continuous>  sodium chloride 0.9% with potassium chloride 20 mEq/L 1000 milliLiter(s) (50 mL/Hr) IV Continuous <Continuous>    MEDICATIONS  (PRN):  acetaminophen    Suspension .. 650 milliGRAM(s) Oral every 6 hours PRN Temp greater or equal to 38C (100.4F), Mild Pain (1 - 3)  senna 2 Tablet(s) Oral at bedtime PRN Constipation      EXAMINATION:  General:  calm  HEENT:  MMM  Neuro:  Intubated, L pupil 2 fixed, R pupil 3 fixed.  +gag, +corneal, w/d x4  Cards:  RRR  Respiratory:  no respiratory distress  Abdomen  soft  Extremities:  no edema  Skin:  warm/dry

## 2019-08-12 NOTE — PROGRESS NOTE ADULT - ASSESSMENT
Summary:  ICH with IVH , ICH score 4 PBD #1    NEURO:  q1h neuro checks  No neurosurgical intervention in the setting of anticoagulation   Poor prognosis,   GOC discussion with family   Palliative care consult     CARDS:  -150  Cardene as needed     PULM:  sat > 92%  Intubated   PRVC     RENAL:   Na 140-150     GASTRO:  advance as tolerated  Stress ulcer prophylaxis:  PPI    HEME:  monitor H/H    DVT prophylaxis: SCDs, hold anticoagulation, fresh heme     ENDO:  euglycemia    ID:  afebrile    Code status:  Full code  Disposition:  ICU    This patient was at high risk of neurologic deterioration and/or death due to:     Time spent:  45 minutes

## 2019-08-13 NOTE — PROGRESS NOTE ADULT - SUBJECTIVE AND OBJECTIVE BOX
HPI:  63 yo M hx CVA, HTN, HLD, afib on xarelto, dm2, transferred from [facility] for R cerebral intraparenchymal hemorrhage with midline shift. Patient resides at NH, last seen normal last night, found unresponsive in room this morning. Intubated at [facility]. Devastating ICH on initial CT. (12 Aug 2019 13:05)    On admission, the patient was:  GCS: 3  ICH score: 4      ROS: unable to obtain due to patients mental status     ICU Vital Signs Last 24 Hrs  T(C): 36.4 (13 Aug 2019 11:00), Max: 37.7 (12 Aug 2019 18:00)  T(F): 97.5 (13 Aug 2019 11:00), Max: 99.9 (12 Aug 2019 18:00)  HR: 78 (13 Aug 2019 11:00) (50 - 81)  BP: 98/59 (13 Aug 2019 11:00) (83/50 - 190/82)  BP(mean): 72 (13 Aug 2019 11:00) (62 - 119)  ABP: --  ABP(mean): --  RR: 12 (13 Aug 2019 11:00) (12 - 35)  SpO2: 100% (13 Aug 2019 11:00) (100% - 100%)        LABS:                                   9.1    17.2  )-----------( 295      ( 12 Aug 2019 21:26 )             27.3   08-12    144  |  108  |  24<H>  ----------------------------<  274<H>  4.1   |  18<L>  |  1.85<H>    Ca    8.9      12 Aug 2019 21:26  Phos  4.3     08-12  Mg     1.5     08-12    TPro  6.0  /  Alb  3.6  /  TBili  0.7  /  DBili  x   /  AST  30  /  ALT  24  /  AlkPhos  27<L>  08-12               8.8    14.8  )-----------( 267      ( 12 Aug 2019 11:38 )             27.7   08-12    143  |  107  |  25<H>  ----------------------------<  397<H>  3.4<L>   |  19<L>  |  1.69<H>    Ca    8.4      12 Aug 2019 11:38    TPro  6.0  /  Alb  3.6  /  TBili  0.7  /  DBili  x   /  AST  30  /  ALT  24  /  AlkPhos  27<L>  08-12    PT/INR - ( 12 Aug 2019 12:55 )   PT: 14.4 sec;   INR: 1.25 ratio         PTT - ( 12 Aug 2019 11:38 )  PTT:32.2 sec    Imaging :   CTH 8/12:   Right 8.1 x 6.3 x 5.4 centers AP, TR, CC frontotemporal intraparenchymal,   subarachnoid, and intraventricular hemorrhage. Right uncal, and   subfalcine herniation, 2 cm leftward shift with trapped left lateral   ventricle. Mass effect, with sulcal effacement, and decreased attenuation   new ischemia not excluded. Intervertebral hemorrhage with hydrocephalus,   effaced quadrigeminal plate, leftward midbrain displacement effacing the   left ambient cistern and widening of the right ambient cistern.    MEDICATIONS  (STANDING):  chlorhexidine 0.12% Liquid 15 milliLiter(s) Oral Mucosa every 12 hours  chlorhexidine 4% Liquid 1 Application(s) Topical <User Schedule>  dextrose 5%. 1000 milliLiter(s) (50 mL/Hr) IV Continuous <Continuous>  dextrose 50% Injectable 12.5 Gram(s) IV Push once  dextrose 50% Injectable 25 Gram(s) IV Push once  dextrose 50% Injectable 25 Gram(s) IV Push once  docusate sodium 100 milliGRAM(s) Oral three times a day  insulin lispro (HumaLOG) corrective regimen sliding scale   SubCutaneous every 6 hours  pantoprazole  Injectable 40 milliGRAM(s) IV Push daily  propofol Infusion 10 MICROgram(s)/kG/Min (4.2 mL/Hr) IV Continuous <Continuous>  sodium chloride 0.9% with potassium chloride 20 mEq/L 1000 milliLiter(s) (50 mL/Hr) IV Continuous <Continuous>    MEDICATIONS  (PRN):  acetaminophen    Suspension .. 650 milliGRAM(s) Oral every 6 hours PRN Temp greater or equal to 38C (100.4F), Mild Pain (1 - 3)  dextrose 40% Gel 15 Gram(s) Oral once PRN Blood Glucose LESS THAN 70 milliGRAM(s)/deciliter  glucagon  Injectable 1 milliGRAM(s) IntraMuscular once PRN Glucose LESS THAN 70 milligrams/deciliter  senna 2 Tablet(s) Oral at bedtime PRN Constipation        EXAMINATION:  General:  calm  HEENT:  MMM  Neuro:  Intubated, L pupil 2 fixed, R pupil 3 fixed.  +gag, +corneal, w/d x4  Cards:  RRR  Respiratory:  no respiratory distress  Abdomen  soft  Extremities:  no edema  Skin:  warm/dry HPI:  63 yo M hx CVA, HTN, HLD, afib on xarelto, dm2, transferred from [facility] for R cerebral intraparenchymal hemorrhage with midline shift. Patient resides at NH, last seen normal last night, found unresponsive in room this morning. Intubated at [facility]. Devastating ICH on initial CT. (12 Aug 2019 13:05)    On admission, the patient was:  GCS: 3  ICH score: 4      ROS: unable to obtain due to patients mental status     ICU Vital Signs Last 24 Hrs  T(C): 36.4 (13 Aug 2019 11:00), Max: 37.7 (12 Aug 2019 18:00)  T(F): 97.5 (13 Aug 2019 11:00), Max: 99.9 (12 Aug 2019 18:00)  HR: 78 (13 Aug 2019 11:00) (50 - 81)  BP: 98/59 (13 Aug 2019 11:00) (83/50 - 190/82)  BP(mean): 72 (13 Aug 2019 11:00) (62 - 119)    RR: 12 (13 Aug 2019 11:00) (12 - 35)  SpO2: 100% (13 Aug 2019 11:00) (100% - 100%)        LABS:                                   9.1    17.2  )-----------( 295      ( 12 Aug 2019 21:26 )             27.3   08-12    144  |  108  |  24<H>  ----------------------------<  274<H>  4.1   |  18<L>  |  1.85<H>    Ca    8.9      12 Aug 2019 21:26  Phos  4.3     08-12  Mg     1.5     08-12    TPro  6.0  /  Alb  3.6  /  TBili  0.7  /  DBili  x   /  AST  30  /  ALT  24  /  AlkPhos  27<L>  08-12               8.8    14.8  )-----------( 267      ( 12 Aug 2019 11:38 )             27.7   08-12    143  |  107  |  25<H>  ----------------------------<  397<H>  3.4<L>   |  19<L>  |  1.69<H>    Ca    8.4      12 Aug 2019 11:38    TPro  6.0  /  Alb  3.6  /  TBili  0.7  /  DBili  x   /  AST  30  /  ALT  24  /  AlkPhos  27<L>  08-12    PT/INR - ( 12 Aug 2019 12:55 )   PT: 14.4 sec;   INR: 1.25 ratio         PTT - ( 12 Aug 2019 11:38 )  PTT:32.2 sec    Imaging :   CTH 8/12:   Right 8.1 x 6.3 x 5.4 centers AP, TR, CC frontotemporal intraparenchymal,   subarachnoid, and intraventricular hemorrhage. Right uncal, and   subfalcine herniation, 2 cm leftward shift with trapped left lateral   ventricle. Mass effect, with sulcal effacement, and decreased attenuation   new ischemia not excluded. Intervertebral hemorrhage with hydrocephalus,   effaced quadrigeminal plate, leftward midbrain displacement effacing the   left ambient cistern and widening of the right ambient cistern.    MEDICATIONS  (STANDING):  chlorhexidine 0.12% Liquid 15 milliLiter(s) Oral Mucosa every 12 hours  chlorhexidine 4% Liquid 1 Application(s) Topical <User Schedule>  dextrose 5%. 1000 milliLiter(s) (50 mL/Hr) IV Continuous <Continuous>  dextrose 50% Injectable 12.5 Gram(s) IV Push once  dextrose 50% Injectable 25 Gram(s) IV Push once  dextrose 50% Injectable 25 Gram(s) IV Push once  docusate sodium 100 milliGRAM(s) Oral three times a day  insulin lispro (HumaLOG) corrective regimen sliding scale   SubCutaneous every 6 hours  pantoprazole  Injectable 40 milliGRAM(s) IV Push daily  propofol Infusion 10 MICROgram(s)/kG/Min (4.2 mL/Hr) IV Continuous <Continuous>  sodium chloride 0.9% with potassium chloride 20 mEq/L 1000 milliLiter(s) (50 mL/Hr) IV Continuous <Continuous>    MEDICATIONS  (PRN):  acetaminophen    Suspension .. 650 milliGRAM(s) Oral every 6 hours PRN Temp greater or equal to 38C (100.4F), Mild Pain (1 - 3)  dextrose 40% Gel 15 Gram(s) Oral once PRN Blood Glucose LESS THAN 70 milliGRAM(s)/deciliter  glucagon  Injectable 1 milliGRAM(s) IntraMuscular once PRN Glucose LESS THAN 70 milligrams/deciliter  senna 2 Tablet(s) Oral at bedtime PRN Constipation        EXAMINATION:  General:  Coma - no sedative or analgesic given prior to exam  Neuro:  Intubated, L pupil 2 fixed, R pupil 3 fixed.   no gag, no cough, no dolls, no response on cold calorics no corneal, flaccid B/L UE and no movement LE to noxious stimuli  Cards:  RRR  Respiratory:  no respiratory distress  Abdomen  soft  Extremities:  no edema  Skin:  warm/dry

## 2019-08-13 NOTE — PHYSICAL THERAPY INITIAL EVALUATION ADULT - PRECAUTIONS/LIMITATIONS, REHAB EVAL
CT head: Right 8.1 x 6.3 x 5.4 centers AP, TR, CC frontotemporal intraparenchymal, subarachnoid, and intraventricular hemorrhage. Right uncal, and subfalcine herniation, 2 cm leftward shift with trapped left lateral ventricle. Mass effect, with sulcal effacement, and decreased attenuation new ischemia not excluded. Intervertebral hemorrhage with hydrocephalus, effaced quadrigeminal plate, leftward midbrain displacement effacing the left ambient cistern and widening of the right ambient cistern.

## 2019-08-13 NOTE — PROGRESS NOTE ADULT - ASSESSMENT
Summary:  ICH with IVH , ICH score 4 PBD #2    NEURO:  q1h neuro checks  No neurosurgical intervention in the setting of anticoagulation   Poor prognosis,   GOC discussion with family   Palliative care consult     CARDS:  -150  Cardene as needed     PULM:  sat > 92%  Intubated   PRVC     RENAL:   Na 140-150     GASTRO:  advance as tolerated  Stress ulcer prophylaxis:  PPI    HEME:  monitor H/H    DVT prophylaxis: SCDs, hold anticoagulation, fresh heme     ENDO:  euglycemia    ID:  afebrile    Code status:  Full code  Disposition:  ICU    This patient was at high risk of neurologic deterioration and/or death due to:     Time spent:  45 minutes Summary:  ICH with IVH , ICH score 4 PBD #2, COMA    NEURO:  q1h neuro checks  No neurosurgical intervention in the setting of anticoagulation   Poor prognosis,   Attempted to contact  family and no response.   Palliative care consult- Pt likely to fulfill brain death will perform apnea test. Check ABG and ensure Temp > 36.5     CARDS:  SBP >100-150  Neosynephrine to maintain SBP >100    PULM:  sat > 92%  Intubated   PRVC - check ABG    RENAL:   Na 135-  <150    GASTRO:  advance as tolerated  Stress ulcer prophylaxis:  PPI    HEME:  monitor H/H    DVT prophylaxis: SCDs, hold anticoagulation, fresh heme     ENDO:  euglycemia    ID:  afebrile    Code status:  Full code  Disposition:  ICU    Time spent:  45 minutes

## 2019-08-13 NOTE — PHYSICAL THERAPY INITIAL EVALUATION ADULT - PERTINENT HX OF CURRENT PROBLEM, REHAB EVAL
Pt is a 63 y/o male admitted to [facility] on 8/12/19 transferred from [facility]. Pt with hx CVA, HTN, HLD, afib on xarelto, dm2, transferred from [facility] for R cerebral intraparenchymal hemorrhage with midline shift. Patient resides at NH, last seen normal last night, found unresponsive in room this morning.

## 2019-08-13 NOTE — PROGRESS NOTE ADULT - SUBJECTIVE AND OBJECTIVE BOX
SUBJECTIVE AND OBJECTIVE:  INTERVAL HPI/OVERNIGHT EVENTS: Remains intubated , with poor prognosis    DNR on chart: Yes    Allergies    Allergy Status Unknown    Intolerances    MEDICATIONS  (STANDING):  chlorhexidine 0.12% Liquid 15 milliLiter(s) Oral Mucosa every 12 hours  chlorhexidine 4% Liquid 1 Application(s) Topical <User Schedule>  dextrose 5%. 1000 milliLiter(s) (50 mL/Hr) IV Continuous <Continuous>  dextrose 50% Injectable 12.5 Gram(s) IV Push once  dextrose 50% Injectable 25 Gram(s) IV Push once  dextrose 50% Injectable 25 Gram(s) IV Push once  docusate sodium 100 milliGRAM(s) Oral three times a day  insulin lispro (HumaLOG) corrective regimen sliding scale   SubCutaneous every 6 hours  pantoprazole  Injectable 40 milliGRAM(s) IV Push daily  propofol Infusion 10 MICROgram(s)/kG/Min (4.2 mL/Hr) IV Continuous <Continuous>  sodium chloride 0.9% with potassium chloride 20 mEq/L 1000 milliLiter(s) (50 mL/Hr) IV Continuous <Continuous>    MEDICATIONS  (PRN):  acetaminophen    Suspension .. 650 milliGRAM(s) Oral every 6 hours PRN Temp greater or equal to 38C (100.4F), Mild Pain (1 - 3)  dextrose 40% Gel 15 Gram(s) Oral once PRN Blood Glucose LESS THAN 70 milliGRAM(s)/deciliter  glucagon  Injectable 1 milliGRAM(s) IntraMuscular once PRN Glucose LESS THAN 70 milligrams/deciliter  senna 2 Tablet(s) Oral at bedtime PRN Constipation      ITEMS UNCHECKED ARE NOT PRESENT    PRESENT SYMPTOMS: [x ]Unable to obtain due to poor mentation   Source if other than patient:  [ ]Family   [ ]Team     Pain:  [ ] yes [ ] no  QOL impact -   Location -                    Aggravating factors -  Quality -  Radiation -  Timing-  Severity (0-10 scale):  Minimal acceptable level (0-10 scale):     Dyspnea:                           [ ]Mild [ ]Moderate [ ]Severe  Anxiety:                             [ ]Mild [ ]Moderate [ ]Severe  Fatigue:                             [ ]Mild [ ]Moderate [ ]Severe  Nausea:                             [ ]Mild [ ]Moderate [ ]Severe  Loss of appetite:              [ ]Mild [ ]Moderate [ ]Severe  Constipation:                    [ ]Mild [ ]Moderate [ ]Severe    PAIN AD Score:	0  [email or web address].[email or web address]/[email or web address] (Ctrl + left click to view)    Other Symptoms:  [ x]All other review of systems negative     Karnofsky Performance Score/Palliative Performance Status Version 2:   10      %    http://npcrc.org/files/news/palliative_performance_scale_ppsv2.pdf  [email or web address].pdf  PHYSICAL EXAM:  Vital Signs Last 24 Hrs  T(C): 35.9 (13 Aug 2019 12:00), Max: 37.7 (12 Aug 2019 18:00)  T(F): 96.6 (13 Aug 2019 12:00), Max: 99.9 (12 Aug 2019 18:00)  HR: 104 (13 Aug 2019 12:02) (50 - 105)  BP: 77/54 (13 Aug 2019 12:00) (77/54 - 190/82)  BP(mean): 60 (13 Aug 2019 12:00) (60 - 119)  RR: 12 (13 Aug 2019 12:00) (12 - 35)  SpO2: 100% (13 Aug 2019 12:02) (100% - 100%) I&O's Summary    12 Aug 2019 07:01  -  13 Aug 2019 07:00  --------------------------------------------------------  IN: 900 mL / OUT: 1555 mL / NET: -655 mL    13 Aug 2019 07:01  -  13 Aug 2019 12:15  --------------------------------------------------------  IN: 200 mL / OUT: 250 mL / NET: -50 mL     GENERAL:  [ ]Alert  [ ]Oriented x   [ ]Lethargic  [ ]Cachexia  [x ]Unarousable  [ ]Verbal  [x ]Non-Verbal  Behavioral:   [ ] Anxiety  [ ] Delirium [ ] Agitation [ ] Other  HEENT:  [ ]Normal   [ ]Dry mouth   [ x]ET Tube/Trach  [ ]Oral lesions  PULMONARY:   [ x]Clear [ ]Tachypnea  [ ]Audible excessive secretions   [ ]Rhonchi        [ ]Right [ ]Left [ ]Bilateral  [ ]Crackles        [ ]Right [ ]Left [ ]Bilateral  [ ]Wheezing     [ ]Right [ ]Left [ ]Bilateral  CARDIOVASCULAR:    [ ]Regular [x ]Irregular [x ]Tachy  [ ]Rodolfo [ ]Murmur [ ]Other  GASTROINTESTINAL:  x[ ]Soft  [ ]Distended   x[ ]+BS  [ ]Non tender [ ]Tender  [ ]PEG [x ]OGT/ NGT   Last BM:    GENITOURINARY:  [ ]Normal [ x] Incontinent   [ ]Oliguria/Anuria   [ ]Loaiza  MUSCULOSKELETAL:   [ ]Normal   [ ]Weakness  [x ]Bed/Wheelchair bound [ ]Edema  NEUROLOGIC:   [ ]No focal deficits  [ x] Cognitive impairment  [ ] Dysphagia [ ]Dysarthria [ ] Paresis [ ]Other   SKIN:   [ ]Normal   [ ]Pressure ulcer(s)  [ ]Rash    CRITICAL CARE:  [ ] Shock Present  [ ]Septic [ ]Cardiogenic [ ]Neurologic [ ]Hypovolemic  [ x]  Vasopressors [ ]  Inotropes  [x ] Respiratory failure present [ x] Mechanical Ventilation [ ] Non-invasive ventilatory support [ ] High-Flow  [ x] Acute  [ ] Chronic [x ] Hypoxic  [x ] Hypercarbic [ ] Other  [ ] Other organ failure     LABS:                        9.1    17.2  )-----------( 295      ( 12 Aug 2019 21:26 )             27.3   08-12    144  |  108  |  24<H>  ----------------------------<  274<H>  4.1   |  18<L>  |  1.85<H>    Ca    8.9      12 Aug 2019 21:26  Phos  4.3     08-12  Mg     1.5     08-12    TPro  6.0  /  Alb  3.6  /  TBili  0.7  /  DBili  x   /  AST  30  /  ALT  24  /  AlkPhos  27<L>  08-12  PT/INR - ( 13 Aug 2019 03:42 )   PT: 11.5 sec;   INR: 1.00 ratio         PTT - ( 12 Aug 2019 11:38 )  PTT:32.2 sec      RADIOLOGY & ADDITIONAL STUDIES:      INTERPRETATION:  CLINICAL INDICATION: 63 yo M   CVA, HTN, HLD, afib on   xarelto, dm2,  transferred from [facility] for R cerebral intraparenchymal hemorrhage with   midline  shift. Patient resides at NH, last seen normal last night, found   unresponsive  in room this morning. Intubated at [facility], SBP initially>200 so cardene drip  ordered however never given- bp dropped after propofol started.    TECHNIQUE: Noncontrast axial CT images were acquired through the head.   Two-dimensional sagittal and coronal reformats were generated.    COMPARISON STUDY: None    FINDINGS:     Large right 8.1 x 6.3 x 5.4 cm, AP, TR, CC frontotemporal   intraparenchymal hemorrhage, centered in the right lentiform nuclei with   subarachnoid extension. There is adjacent edema and mass effect with   intraventricular extension of the hemorrhage with casting extending to an   enlarged fourth ventricle. There is right hemisphere effacement, right   uncal, and subfalcine herniation, with 2 cm leftward shift. Left lateral   ventricle is trapped, there is hydrocephalus with enlargement of the   lateral ventricle left frontal, and bilateral occipital and temporal   horns, third and fourth ventricle.    Surrounding the hemorrhage there is decreased attenuation and loss of   gray-white distinction in the right cerebral hemisphere, and right   midbrain and kerri new ischemia not excluded, correlate with possibility   of a hypertensive hemorrhagic infarct in the right lentiform nuclei.   There is left parietal encephalomalacia and gliosis, likely from remote   infarction, with ex vacuo enlargement of the left lateral occipital horn.   Effacement of sulci in the left cerebral hemisphere and effacement of the   quadrigeminal plate and ambient cisterns with leftward displacement of   the midbrain, causing narrowing of the left contralateral ambient cistern   and slight widening of the right ambient cistern.    Orbits demonstrate disconjugate gaze with absent orbital lenses.   Paranasal sinuses mucosal thickening with diffuse mucosal along the   partially visualized right maxillary sinus. There is mastoid tip   sclerosis and opacification,  film demonstrates an endotracheal tube   in situ.        IMPRESSION:      Right 8.1 x 6.3 x 5.4 centers AP, TR, CC frontotemporal intraparenchymal,   subarachnoid, and intraventricular hemorrhage. Right uncal, and   subfalcine herniation, 2 cm leftward shift with trapped left lateral   ventricle. Mass effect, with sulcal effacement, and decreased attenuation   new ischemia not excluded. Intervertebral hemorrhage with hydrocephalus,   effaced quadrigeminal plate, leftward midbrain displacement effacing the   left ambient cistern and widening of the right ambient cistern.    Findings discussed with Dr. Wasserman in the emergency department at   immediate time of review on 8/12/2019 at 11:55 AM.            Protein Calorie Malnutrition Present: [ ] yes x ] no  [ ] PPSV2 < or = 30%  [ ] significant weight loss [ x poor nutritional intake [ ] anasarca [ ] catabolic state Albumin, Serum: 3.6 g/dL (08-12-19 @ 11:38)  Artificial Nutrition [ ]     REFERRALS:   [ ]Chaplaincy  [ ] Hospice  [ ]Child Life  [ ]Social Work  [ ]Case management [ ]Holistic Therapy     Goals of Care Document:    Care Coordination Assessment [C. Provider] (08-13-19 @ 09:53)

## 2019-08-13 NOTE — PROGRESS NOTE ADULT - ASSESSMENT
63 Y/O PMH as described above admitted after being found unresponsive in the nursing home, transferred to [facility] from [facility] with  right cerebral ICH   grade 4 , midline shift, called for goals of care

## 2019-08-13 NOTE — SWALLOW BEDSIDE ASSESSMENT ADULT - SWALLOW EVAL: DIAGNOSIS
Consult for bedside swallow evaluation received and appreciated. At this time pt intubated and not appropriate for evaluation. No skilled SLP needs as pt not responsive. Please reconsult if medically appropriate. D/w attending MD.

## 2019-08-14 NOTE — PROGRESS NOTE ADULT - ASSESSMENT
Summary:  ICH with IVH , ICH score 4 PBD #3, COMA    NEURO:  q1h neuro checks  No neurosurgical intervention in the setting of anticoagulation   Poor prognosis,   Attempted to contact  family and no response.   Palliative care consult- Pt likely to fulfill brain death will perform apnea test. Check ABG and ensure Temp > 36.5     CARDS:  SBP >100-150  Neosynephrine to maintain SBP >100    PULM:  sat > 92%  Intubated   PRVC - check ABG    RENAL:   Na 135-  <150    GASTRO:  advance as tolerated  Stress ulcer prophylaxis:  PPI    HEME:  monitor H/H    DVT prophylaxis: SCDs, hold anticoagulation, fresh heme     ENDO:  euglycemia    ID:  afebrile    Code status:  Full code  Disposition:  ICU    Time spent:  45 minutes

## 2019-08-14 NOTE — PROGRESS NOTE ADULT - SUBJECTIVE AND OBJECTIVE BOX
HPI:  63 yo M hx CVA, HTN, HLD, afib on xarelto, dm2, transferred from [facility] for R cerebral intraparenchymal hemorrhage with midline shift. Patient resides at NH, last seen normal last night, found unresponsive in room this morning. Intubated at [facility]. Devastating ICH on initial CT. (12 Aug 2019 13:05)    On admission, the patient was:  GCS: 3  ICH score: 4      ROS: unable to obtain due to patients mental status     ICU Vital Signs Last 24 Hrs  T(C): 37.1 (14 Aug 2019 03:00), Max: 38.1 (13 Aug 2019 16:00)  T(F): 98.8 (14 Aug 2019 03:00), Max: 100.6 (13 Aug 2019 16:00)  HR: 77 (14 Aug 2019 07:00) (60 - 105)  BP: 113/63 (14 Aug 2019 07:00) (70/49 - 190/82)  BP(mean): 83 (14 Aug 2019 07:00) (54 - 118)  RR: 12 (14 Aug 2019 07:00) (10 - 35)  SpO2: 100% (14 Aug 2019 07:00) (100% - 100%)          LABS:                        8.0    17.7  )-----------( 228      ( 13 Aug 2019 23:03 )             28.0   08-14    162<HH>  |  >135<H>  |  23  ----------------------------<  154<H>  4.2   |  19<L>  |  2.33<H>    Ca    9.2      14 Aug 2019 00:06  Phos  0.8     08-14  Mg     3.0     08-14    TPro  6.3  /  Alb  3.3  /  TBili  0.4  /  DBili  x   /  AST  49<H>  /  ALT  26  /  AlkPhos  32<L>  08-13                                 9.1    17.2  )-----------( 295      ( 12 Aug 2019 21:26 )             27.3   08-12    144  |  108  |  24<H>  ----------------------------<  274<H>  4.1   |  18<L>  |  1.85<H>    Ca    8.9      12 Aug 2019 21:26  Phos  4.3     08-12  Mg     1.5     08-12    TPro  6.0  /  Alb  3.6  /  TBili  0.7  /  DBili  x   /  AST  30  /  ALT  24  /  AlkPhos  27<L>  08-12               8.8    14.8  )-----------( 267      ( 12 Aug 2019 11:38 )             27.7   08-12    143  |  107  |  25<H>  ----------------------------<  397<H>  3.4<L>   |  19<L>  |  1.69<H>    Ca    8.4      12 Aug 2019 11:38    TPro  6.0  /  Alb  3.6  /  TBili  0.7  /  DBili  x   /  AST  30  /  ALT  24  /  AlkPhos  27<L>  08-12    PT/INR - ( 12 Aug 2019 12:55 )   PT: 14.4 sec;   INR: 1.25 ratio         PTT - ( 12 Aug 2019 11:38 )  PTT:32.2 sec    Imaging :   CT 8/12:   Right 8.1 x 6.3 x 5.4 centers AP, TR, CC frontotemporal intraparenchymal,   subarachnoid, and intraventricular hemorrhage. Right uncal, and   subfalcine herniation, 2 cm leftward shift with trapped left lateral   ventricle. Mass effect, with sulcal effacement, and decreased attenuation   new ischemia not excluded. Intervertebral hemorrhage with hydrocephalus,   effaced quadrigeminal plate, leftward midbrain displacement effacing the   left ambient cistern and widening of the right ambient cistern.    MEDICATIONS  (STANDING):  chlorhexidine 0.12% Liquid 15 milliLiter(s) Oral Mucosa every 12 hours  chlorhexidine 4% Liquid 1 Application(s) Topical <User Schedule>  dextrose 5%. 1000 milliLiter(s) (50 mL/Hr) IV Continuous <Continuous>  dextrose 50% Injectable 12.5 Gram(s) IV Push once  dextrose 50% Injectable 25 Gram(s) IV Push once  dextrose 50% Injectable 25 Gram(s) IV Push once  docusate sodium 100 milliGRAM(s) Oral three times a day  insulin lispro (HumaLOG) corrective regimen sliding scale   SubCutaneous every 6 hours  pantoprazole  Injectable 40 milliGRAM(s) IV Push daily  phenylephrine    Infusion 0.1 MICROgram(s)/kG/Min (1.988 mL/Hr) IV Continuous <Continuous>  propofol Infusion 10 MICROgram(s)/kG/Min (4.2 mL/Hr) IV Continuous <Continuous>    MEDICATIONS  (PRN):  acetaminophen    Suspension .. 650 milliGRAM(s) Oral every 6 hours PRN Temp greater or equal to 38C (100.4F), Mild Pain (1 - 3)  dextrose 40% Gel 15 Gram(s) Oral once PRN Blood Glucose LESS THAN 70 milliGRAM(s)/deciliter  glucagon  Injectable 1 milliGRAM(s) IntraMuscular once PRN Glucose LESS THAN 70 milligrams/deciliter  senna 2 Tablet(s) Oral at bedtime PRN Constipation        EXAMINATION:  General:  Coma - no sedative or analgesic given prior to exam  Neuro:  Intubated, L pupil 2 fixed, R pupil 3 fixed.   no gag, no cough, no dolls, no response on cold calorics no corneal, flaccid B/L UE and no movement LE to noxious stimuli  Cards:  RRR  Respiratory:  no respiratory distress  Abdomen  soft  Extremities:  no edema  Skin:  warm/dry

## 2019-08-14 NOTE — PROGRESS NOTE ADULT - ASSESSMENT
63 Y/O PMH as described above admitted after being found unresponsive in the nursing home, transferred to [facility] from [facility] with  right cerebral ICH   grade 4 , midline shift, called for goals of care

## 2019-08-14 NOTE — PROGRESS NOTE ADULT - SUBJECTIVE AND OBJECTIVE BOX
SUBJECTIVE AND OBJECTIVE:  INTERVAL HPI/OVERNIGHT EVENTS:  Remains unresponsive, poor neuro exam    DNR on chart: Yes    Allergies    Allergy Status Unknown    Intolerances    MEDICATIONS  (STANDING):  chlorhexidine 0.12% Liquid 15 milliLiter(s) Oral Mucosa every 12 hours  chlorhexidine 4% Liquid 1 Application(s) Topical <User Schedule>  dextrose 5%. 1000 milliLiter(s) (50 mL/Hr) IV Continuous <Continuous>  dextrose 5%. 1000 milliLiter(s) (100 mL/Hr) IV Continuous <Continuous>  dextrose 50% Injectable 12.5 Gram(s) IV Push once  dextrose 50% Injectable 25 Gram(s) IV Push once  dextrose 50% Injectable 25 Gram(s) IV Push once  docusate sodium 100 milliGRAM(s) Oral three times a day  insulin lispro (HumaLOG) corrective regimen sliding scale   SubCutaneous every 6 hours  pantoprazole  Injectable 40 milliGRAM(s) IV Push daily  phenylephrine    Infusion 0.1 MICROgram(s)/kG/Min (1.988 mL/Hr) IV Continuous <Continuous>  propofol Infusion 10 MICROgram(s)/kG/Min (4.2 mL/Hr) IV Continuous <Continuous>    MEDICATIONS  (PRN):  acetaminophen    Suspension .. 650 milliGRAM(s) Oral every 6 hours PRN Temp greater or equal to 38C (100.4F), Mild Pain (1 - 3)  dextrose 40% Gel 15 Gram(s) Oral once PRN Blood Glucose LESS THAN 70 milliGRAM(s)/deciliter  glucagon  Injectable 1 milliGRAM(s) IntraMuscular once PRN Glucose LESS THAN 70 milligrams/deciliter  senna 2 Tablet(s) Oral at bedtime PRN Constipation      ITEMS UNCHECKED ARE NOT PRESENT    PRESENT SYMPTOMS: [ x]Unable to obtain due to poor mentation   Source if other than patient:  [ ]Family   [ ]Team     Pain:  [ ] yes [x ] no  QOL impact -   Location -                    Aggravating factors -  Quality -  Radiation -  Timing-  Severity (0-10 scale):  Minimal acceptable level (0-10 scale):     Dyspnea:                           [ ]Mild [ ]Moderate [ ]Severe  Anxiety:                             [ ]Mild [ ]Moderate [ ]Severe  Fatigue:                             [ ]Mild [ ]Moderate [ ]Severe  Nausea:                             [ ]Mild [ ]Moderate [ ]Severe  Loss of appetite:              [ ]Mild [ ]Moderate [ ]Severe  Constipation:                    [ ]Mild [ ]Moderate [ ]Severe    PAIN AD Score:	0  [email or web address] (Ctrl + left click to view)    Other Symptoms:  [ ]All other review of systems negative     Karnofsky Performance Score/Palliative Performance Status Version 2:   10      %    http://[email or web address].org/files/news/[email or web address]_performance_scale_[email or web address].pdf  [email or web address]  PHYSICAL EXAM:  Vital Signs Last 24 Hrs  T(C): 37.6 (14 Aug 2019 13:00), Max: 38.1 (13 Aug 2019 16:00)  T(F): 99.7 (14 Aug 2019 13:00), Max: 100.6 (13 Aug 2019 16:00)  HR: 79 (14 Aug 2019 13:15) (70 - 95)  BP: 104/61 (14 Aug 2019 13:15) (70/49 - 172/103)  BP(mean): 75 (14 Aug 2019 13:15) (54 - 127)  RR: 12 (14 Aug 2019 13:15) (10 - 14)  SpO2: 100% (14 Aug 2019 13:15) (100% - 100%) I&O's Summary    13 Aug 2019 07:01  -  14 Aug 2019 07:00  --------------------------------------------------------  IN: 1998.6 mL / OUT: 2740 mL / NET: -741.4 mL    14 Aug 2019 07:01  -  14 Aug 2019 14:01  --------------------------------------------------------  IN: 872.8 mL / OUT: 410 mL / NET: 462.8 mL     GENERAL:  [ ]Alert  [ ]Oriented x   [ ]Lethargic  [ ]Cachexia  [x ]Unarousable  [ ]Verbal  [x]Non-Verbal  Behavioral:   [ ] Anxiety  [ ] Delirium [ ] Agitation [ ] Other  HEENT:  [ ]Normal   [ ]Dry mouth   [ x]ET Tube/Trach  [ ]Oral lesions  PULMONARY:   [ ]Clear [ ]Tachypnea  [ ]Audible excessive secretions   [ ]Rhonchi        [ ]Right [ ]Left [ ]Bilateral  [ ]Crackles        [ ]Right [ ]Left [ ]Bilateral  [ ]Wheezing     [ ]Right [ ]Left [ ]Bilateral  CARDIOVASCULAR:    [ ]Regular [ x]Irregular [ ]Tachy  [ ]Rodolfo [ ]Murmur [ ]Other  GASTROINTESTINAL:  [x ]Soft  [ ]Distended   [ x]+BS  [ ]Non tender [ ]Tender  [ ]PEG [ ]OGT/ NGT   Last BM:    GENITOURINARY:  [ ]Normal [x ] Incontinent   [ ]Oliguria/Anuria   [ ]Loaiza  MUSCULOSKELETAL:   [ ]Normal   [ ]Weakness  [x ]Bed/Wheelchair bound [ ]Edema  NEUROLOGIC:   [ ]No focal deficits  [ ]x Cognitive impairment  [ ] Dysphagia [ ]Dysarthria [ ] Paresis [ ]Other   SKIN:   [x ]Normal   [ ]Pressure ulcer(s)  [ ]Rash    CRITICAL CARE:  [ ] Shock Present  [ ]Septic [ ]Cardiogenic [ ]Neurologic [ ]Hypovolemic  [ ]  Vasopressors [ ]  Inotropes  [x ] Respiratory failure present [x ] Mechanical Ventilation [ ] Non-invasive ventilatory support [ ] High-Flow  [ x] Acute  [ ] Chronic [ ] Hypoxic  [ ] Hypercarbic [ ] Other  [ ] Other organ failure     LABS:                        8.0    17.7  )-----------( 228      ( 13 Aug 2019 23:03 )             28.0   08-14    167<HH>  |  >135<H>  |  23  ----------------------------<  119<H>  4.8   |  19<L>  |  2.61<H>    Ca    8.9      14 Aug 2019 08:12  Phos  3.8     08-14  Mg     3.0     08-14    TPro  6.3  /  Alb  3.3  /  TBili  0.4  /  DBili  x   /  AST  49<H>  /  ALT  26  /  AlkPhos  32<L>  08-13  PT/INR - ( 13 Aug 2019 03:42 )   PT: 11.5 sec;   INR: 1.00 ratio               RADIOLOGY & ADDITIONAL STUDIES:    ON:  CLINICAL INDICATION: 65 yo M   CVA, HTN, HLD, afib on   xarelto, dm2,  transferred from [facility] for R cerebral intraparenchymal hemorrhage with   midline  shift. Patient resides at NH, last seen normal last night, found   unresponsive  in room this morning. Intubated at [facility], SBP initially>200 so cardene drip  ordered however never given- bp dropped after propofol started.    TECHNIQUE: Noncontrast axial CT images were acquired through the head.   Two-dimensional sagittal and coronal reformats were generated.    COMPARISON STUDY: None    FINDINGS:     Large right 8.1 x 6.3 x 5.4 cm, AP, TR, CC frontotemporal   intraparenchymal hemorrhage, centered in the right lentiform nuclei with   subarachnoid extension. There is adjacent edema and mass effect with   intraventricular extension of the hemorrhage with casting extending to an   enlarged fourth ventricle. There is right hemisphere effacement, right   uncal, and subfalcine herniation, with 2 cm leftward shift. Left lateral   ventricle is trapped, there is hydrocephalus with enlargement of the   lateral ventricle left frontal, and bilateral occipital and temporal   horns, third and fourth ventricle.    Surrounding the hemorrhage there is decreased attenuation and loss of   gray-white distinction in the right cerebral hemisphere, and right   midbrain and kerri new ischemia not excluded, correlate with possibility   of a hypertensive hemorrhagic infarct in the right lentiform nuclei.   There is left parietal encephalomalacia and gliosis, likely from remote   infarction, with ex vacuo enlargement of the left lateral occipital horn.   Effacement of sulci in the left cerebral hemisphere and effacement of the   quadrigeminal plate and ambient cisterns with leftward displacement of   the midbrain, causing narrowing of the left contralateral ambient cistern   and slight widening of the right ambient cistern.    Orbits demonstrate disconjugate gaze with absent orbital lenses.   Paranasal sinuses mucosal thickening with diffuse mucosal along the   partially visualized right maxillary sinus. There is mastoid tip   sclerosis and opacification,  film demonstrates an endotracheal tube   in situ.        IMPRESSION:      Right 8.1 x 6.3 x 5.4 centers AP, TR, CC frontotemporal intraparenchymal,   subarachnoid, and intraventricular hemorrhage. Right uncal, and   subfalcine herniation, 2 cm leftward shift with trapped left lateral   ventricle. Mass effect, with sulcal effacement, and decreased attenuation   new ischemia not excluded. Intervertebral hemorrhage with hydrocephalus,   effaced quadrigeminal plate, leftward midbrain displacement effacing the   left ambient cistern and widening of the right ambient cistern.    Findings discussed with Dr. Wasserman in the emergency department at   immediate time of review on 8/12/2019 at 11:55 AM.                  Protein Calorie Malnutrition Present: [ ] yes [ ] no  [ ] PPSV2 < or = 30%  [ ] significant weight loss [ ] poor nutritional intake [ ] anasarca [ ] catabolic state Albumin, Serum: 3.3 g/dL (08-13-19 @ 21:59)  Artificial Nutrition [ ]     REFERRALS:   [ ]Chaplaincy  [ ] Hospice  [ ]Child Life  [ ]Social Work  [ ]Case management [ ]Holistic Therapy     Goals of Care Document:    Care Coordination Assessment [C. Provider] (08-13-19 @ 09:53)

## 2019-08-14 NOTE — CONSULT NOTE ADULT - SUBJECTIVE AND OBJECTIVE BOX
Clinical summary:   65 yo M hx CVA, HTN, HLD, afib on xarelto, dm2, transferred from [facility]  for R cerebral intraparenchymal hemorrhage with midline shift. Patient resides at NH, last seen normal last night, found unresponsive in room this morning. Intubated at [facility]. Devastating ICH on initial CT Scan. Team has performed a two physician DNR/DNI as sister Francisca Scales has been unreachable. Today [facility] [place] requests administrative consent for organ procurement. Ethics contacted 	  Prognosis Estimate (survival in hrs, days, wks, mos, yrs): grave   Patient Decision-Making Capacity: Does not have capacity for this decision   Patient Aware of:  Diagnosis:   Yes    No   Unknown    Prognosis:   Yes    No   Unknown       Name of medical decision-maker should patient lack capacity (relationship): Francisca ScalesRosa [id number] [facility] [id number]  Role:   Health Care Agent      Legal Surrogate   Contact #(s): [phone]  Other Decision-Maker (i.e., HCA or Surrogate) Aware of:  Diagnosis: Yes   No      Prognosis:   Yes     No  Other Stake-Holders:    Evidence of Patient’s Preference of Life-Sustaining Treatment (Written or Oral): None  Resuscitation status:   DNR:  Yes   No      DNI:  Yes   No            Discussions:     August 14, 2019- Case discussed with palliative care and reviewed policy # 700.03 Organ Tissue Donation- in the absence of written authorization by the patient to be a donor, the hospital will follow the order of priority for persons making decision ofr organ donation stated in the policy. [place] PHL Section # 4301 authorizes in order of priority:  1.	Decedent’s Health Care Agent  2.	Decedent’s Agent for disposition of remains ()  3.	Spouse or domestic partner  4.	Adult Children  5.	Either Parent  6.	Adult Siblings  7.	Guardian at the time of decedent’s death  8.	Any other person authorized or under obligation to dispose of body    Bioethics analysis:     The central ethical issue is a conflict between patient autonomy, provider non-maleficence, and provider beneficence. Because the patient is unable to express his desires for care, the health team must exercise proportionality (beneficence versus non maleficence) to determine his best interests.  The ethical dilemma of organ procurement in a unbefriended patient, this raises a concern for patient autonomy. To best protect this patient’s autonomy, [facility] # 4301 outlines the surrogate  in the order of priority stated, may, when persons in prior classes are not reasonably available, willing, and able to act, at the time of death, and in the absence of actual notice of contrary indications by the decedent, or actual notice of opposition by a member of the same class or prior class specified in paragraph. For the purposes of this law, "reasonably available" means that a person to be contacted can be contacted without undue effort and willing and able to act in a timely manner consistent with existing medical criteria necessary for the making of an anatomical gift.  Therefore, the ethical analysis must consider whether it is ethically reasonable to utilize administrative consent in a patient without surrogate due to  inability to contact them    In advocating for the principle of social justice, providing organ donation is virtuous. However, [facility] # 4301 only allows the reasonably available statute to be utilized to go to the next hierarchal level. The law does not allow for the organization to insert themselves and provide administrative consent.    During the course of this consult, the ethics team located the address for his sister Francisca Olmedo  [address] [address] [facility] [address]    She was not reasonably available at [phone]
HPI:  63 yo M hx CVA, HTN, HLD, afib on xarelto, dm2, transferred from [facility] for R cerebral intraparenchymal hemorrhage with midline shift. Patient resides at NH, last seen normal last night, found unresponsive in room this morning. Intubated at [facility]. Devastating ICH on initial CT. (12 Aug 2019 13:05)    PERTINENT PM/SXH:   DM2 (diabetes mellitus, type 2)  HLD (hyperlipidemia)  HTN (hypertension)  CVA (cerebral vascular accident)  Atrial fibrillation      FAMILY HISTORY:    ITEMS NOT CHECKED ARE NOT PRESENT    SOCIAL HISTORY:   Significant other/partner:  [ ]  Children:  [X ]  Yazdanism/Spirituality:  Substance hx:  [ ]   Tobacco hx:  [ ]   Alcohol hx: [ ]   Home Opioid hx:  [ ] I-Stop Reference No:  Living Situation: [X ]Home  [ ]Long term care  [ ]Rehab [ ]Other    ADVANCE DIRECTIVES:    DNR  MOLST  [ ]  Living Will  [ ]   DECISION MAKER(s):  [ ] Health Care Proxy(s)  X ] Surrogate(s)  [ ] Guardian           Name(s): Phone Number(s):  DANIEL CAMILO   BASELINE (I)ADL(s) (prior to admission):  [place]: [ ]Total  [X ] Moderate [ ]Dependent    Allergies    Allergy Status Unknown    Intolerances    MEDICATIONS  (STANDING):  chlorhexidine 0.12% Liquid 15 milliLiter(s) Oral Mucosa every 12 hours  niCARdipine Infusion 5 mG/Hr (25 mL/Hr) IV Continuous <Continuous>  pantoprazole  Injectable 40 milliGRAM(s) IV Push daily  propofol Infusion 10 MICROgram(s)/kG/Min (4.2 mL/Hr) IV Continuous <Continuous>  sodium chloride 0.9% with potassium chloride 20 mEq/L 1000 milliLiter(s) (50 mL/Hr) IV Continuous <Continuous>    MEDICATIONS  (PRN):    PRESENT SYMPTOMS: [ X]Unable to obtain due to poor mentation   Source if other than patient:  [ ]Family   [ ]Team     Pain: [ ] yes [X ] no  QOL impact -   Location -                    Aggravating factors -  Quality -  Radiation -  Timing-  Severity (0-10 scale):  Minimal acceptable level (0-10 scale):     PAIN AD Score: 0    [email or web address]/[email or web address] (press ctrl +  left click to view)    Dyspnea:                           [ ]Mild [ ]Moderate [ ]Severe  Anxiety:                             [ ]Mild [ ]Moderate [ ]Severe  Fatigue:                             [ ]Mild [ ]Moderate [ ]Severe  Nausea:                             [ ]Mild [ ]Moderate [ ]Severe  Loss of appetite:              [ ]Mild [ ]Moderate [ ]Severe  Constipation:                    [ ]Mild [ ]Moderate [ ]Severe    Other Symptoms:  [X ]All other review of systems negative     Karnofsky Performance Score/Palliative Performance Status Version 2:  10       %    http://[email or web address].org/files/news/[email or web address]_performance_scale_[email or web address].pdf  PHYSICAL EXAM:  Vital Signs Last 24 Hrs  T(C): 37.6 (12 Aug 2019 16:00), Max: 37.6 (12 Aug 2019 16:00)  T(F): 99.7 (12 Aug 2019 16:00), Max: 99.7 (12 Aug 2019 16:00)  HR: 75 (12 Aug 2019 16:15) (50 - 77)  BP: 149/68 (12 Aug 2019 16:00) (132/70 - 149/79)  BP(mean): 92 (12 Aug 2019 16:00) (88 - 98)  RR: 18 (12 Aug 2019 16:00) (12 - 18)  SpO2: 100% (12 Aug 2019 16:15) (100% - 100%) I&O's Summary    12 Aug 2019 07:01  -  12 Aug 2019 16:40  --------------------------------------------------------  IN: 200 mL / OUT: 220 mL / NET: -20 mL    GENERAL:  [ ]Alert  [ ]Oriented x   [ ]Lethargic  [ ]Cachexia  [X ]Unarousable  [ ]Verbal  [ X]Non-Verbal  Behavioral:   [ ] Anxiety  [ ] Delirium [ ] Agitation [ ] Other  HEENT:  [ ]Normal   [X ]Dry mouth   [X ]ET Tube/Trach  [ ]Oral lesions  PULMONARY:   [ X]Clear [ ]Tachypnea  [ ]Audible excessive secretions   [ ]Rhonchi        [ ]Right [ ]Left [ ]Bilateral  [ ]Crackles        [ ]Right [ ]Left [ ]Bilateral  [ ]Wheezing     [ ]Right [ ]Left [ ]Bilateral  CARDIOVASCULAR:    [ ]Regular X[ ]Irregular [ ]Tachy  [ ]Rodolfo [ ]Murmur [ ]Other  GASTROINTESTINAL:  [ X]Soft  [ ]Distended   [ X]+BS  [ ]Non tender [ ]Tender  [ ]PEG [ X]OGT/ NGT  Last BM:   GENITOURINARY:  [ ]Normal [X ] Incontinent   [ ]Oliguria/Anuria   [ ]Loaiza  MUSCULOSKELETAL:   [ ]Normal   [ ]Weakness  [X]Bed/Wheelchair bound [ ]Edema  NEUROLOGIC:   [ ]No focal deficits  [ X] Cognitive impairment  [ ] Dysphagia [ ]Dysarthria [ ] Paresis [ ]Other   SKIN:   [X ]Normal   [ ]Pressure ulcer(s)  [ ]Rash    CRITICAL CARE:  [ ] Shock Present  [ ]Septic [ ]Cardiogenic [ ]Neurologic [ ]Hypovolemic  [ ]  Vasopressors [ ]  Inotropes   [X ] Respiratory failure present [X ] mechanical ventilation [ ] non-invasive ventilatory support [ ] High flow  [ X] Acute  [ ] Chronic [ ] Hypoxic  [ ] Hypercarbic [ ] Other  [ ] Other organ failure     LABS:                        8.8    14.8  )-----------( 267      ( 12 Aug 2019 11:38 )             27.7   08-12    143  |  107  |  25<H>  ----------------------------<  397<H>  3.4<L>   |  19<L>  |  1.69<H>    Ca    8.4      12 Aug 2019 11:38    TPro  6.0  /  Alb  3.6  /  TBili  0.7  /  DBili  x   /  AST  30  /  ALT  24  /  AlkPhos  27<L>  08-12  PT/INR - ( 12 Aug 2019 12:55 )   PT: 14.4 sec;   INR: 1.25 ratio         PTT - ( 12 Aug 2019 11:38 )  PTT:32.2 sec      RADIOLOGY & ADDITIONAL STUDIES:    INTERPRETATION:  CLINICAL INDICATION: 63 yo M   CVA, HTN, HLD, afib on   xarelto, dm2,  transferred from [facility] for R cerebral intraparenchymal hemorrhage with   midline  shift. Patient resides at NH, last seen normal last night, found   unresponsive  in room this morning. Intubated at [facility], SBP initially>200 so cardene drip  ordered however never given- bp dropped after propofol started.    TECHNIQUE: Noncontrast axial CT images were acquired through the head.   Two-dimensional sagittal and coronal reformats were generated.    COMPARISON STUDY: None    FINDINGS:     Large right 8.1 x 6.3 x 5.4 cm, AP, TR, CC frontotemporal   intraparenchymal hemorrhage, centered in the right lentiform nuclei with   subarachnoid extension. There is adjacent edema and mass effect with   intraventricular extension of the hemorrhage with casting extending to an   enlarged fourth ventricle. There is right hemisphere effacement, right   uncal, and subfalcine herniation, with 2 cm leftward shift. Left lateral   ventricle is trapped, there is hydrocephalus with enlargement of the   lateral ventricle left frontal, and bilateral occipital and temporal   horns, third and fourth ventricle.    Surrounding the hemorrhage there is decreased attenuation and loss of   gray-white distinction in the right cerebral hemisphere, and right   midbrain and kerri new ischemia not excluded, correlate with possibility   of a hypertensive hemorrhagic infarct in the right lentiform nuclei.   There is left parietal encephalomalacia and gliosis, likely from remote   infarction, with ex vacuo enlargement of the left lateral occipital horn.   Effacement of sulci in the left cerebral hemisphere and effacement of the   quadrigeminal plate and ambient cisterns with leftward displacement of   the midbrain, causing narrowing of the left contralateral ambient cistern   and slight widening of the right ambient cistern.    Orbits demonstrate disconjugate gaze with absent orbital lenses.   Paranasal sinuses mucosal thickening with diffuse mucosal along the   partially visualized right maxillary sinus. There is mastoid tip   sclerosis and opacification,  film demonstrates an endotracheal tube   in situ.        IMPRESSION:      Right 8.1 x 6.3 x 5.4 centers AP, TR, CC frontotemporal intraparenchymal,   subarachnoid, and intraventricular hemorrhage. Right uncal, and   subfalcine herniation, 2 cm leftward shift with trapped left lateral   ventricle. Mass effect, with sulcal effacement, and decreased attenuation   new ischemia not excluded. Intervertebral hemorrhage with hydrocephalus,   effaced quadrigeminal plate, leftward midbrain displacement effacing the   left ambient cistern and widening of the right ambient cistern.    Findings discussed with Dr. Wasserman in the emergency department at   immediate time of review on 8/12/2019 at 11:55 AM.            PROTEIN CALORIE MALNUTRITION PRESENT: [ X] Yes [ ] No  [ ] PPSV2 < or = to 30% [ ] significant weight loss  [ X] poor nutritional intake [ ] catabolic state [ ] anasarca     Albumin, Serum: 3.6 g/dL (08-12-19 @ 11:38)  Artificial Nutrition [ ]     REFERRALS:   [ ]Chaplaincy  [ ] Hospice  [ ]Child Life  [X]Social Work  [ ]Case management [ ]Holistic Therapy     Goals of Care Document:
p (1480)     HPI: 63 yo M hx CVA, HTN, HLD, afib on xarelto, dm2, transferred from [facility] for R cerebral intraparenchymal hemorrhage with midline shift. Patient resides at NH, last seen normal last night, found unresponsive in room this morning. Intubated at [facility].      Imaging:    Exam:  Intubated, L pupil 2 fixed, R pupil 3 fixed.  +gag, +corneal, w/d x4    --Anticoagulation:    =====================  PAST MEDICAL HISTORY   DM2 (diabetes mellitus, type 2)  HLD (hyperlipidemia)  HTN (hypertension)  CVA (cerebral vascular accident)  Atrial fibrillation    PAST SURGICAL HISTORY         MEDICATIONS:  Antibiotics:    Neuro:  propofol Infusion 10 MICROgram(s)/kG/Min IV Continuous <Continuous>  propofol Infusion 10 MICROgram(s)/kG/Min IV Continuous <Continuous>    Other:  niCARdipine Infusion 5 mG/Hr IV Continuous <Continuous>      SOCIAL HISTORY:   Occupation:   Marital Status:     FAMILY HISTORY:      ROS: Negative except per HPI    LABS:  PTT - ( 12 Aug 2019 11:38 )  PTT:32.2 sec                        8.8    14.8  )-----------( 267      ( 12 Aug 2019 11:38 )             27.7

## 2019-08-14 NOTE — OCCUPATIONAL THERAPY INITIAL EVALUATION ADULT - PERTINENT HX OF CURRENT PROBLEM, REHAB EVAL
63 yo M hx CVA, HTN, HLD, afib on xarelto, dm2, transferred from [facility] for R cerebral intraparenchymal hemorrhage with midline shift. Patient resides at NH, last seen normal last night, found unresponsive in room this morning. Intubated at [facility]. Devastating ICH on initial CT.

## 2019-08-14 NOTE — CONSULT NOTE ADULT - ASSESSMENT
Due to proportionality (the weighing of benefit and risks of potential violation of the patient’s autonomy), as well as the [facility] PHL law, organ procurement can not be obtained by administrative consent.    Please attempt through  to send a certified letter to his sister’s home or send law enforcement agent. Ethics will attempt phone call in hours that are outside of customary business hours.     The Ethics service will remain available for further conversation about patient’s current condition and decision-points that may be occur in patient’s illness.     More than 50% of the time of this consultation was spent in coordination of Care of Patient
63 Y/O PMH as described above admitted after being found unresponsive in the nursing home, transferred to [facility] from [facility] with  right cerebral ICH   grade 4 , midline shift, called for goals of care
64M hx CVA, HTN, HLD, afib on xarelto, dm2, transferred from [facility]. Found to have large R ICH. Repeat CT grossly stable.  Exam: Intubated, L pupil 2 fixed, R pupil 3 fixed. +gag, +corneal, w/d x4  - ICH score 4 (97% mortality)  - On xaralto, s/p VitK, Kcentra ordered  - Repeat CT grossly stable  - Admit to NSCU under Dr. Dawkins  - SBP <160  - No acute neurosurgical intervention  - Consult palliative for GOC

## 2019-08-14 NOTE — OCCUPATIONAL THERAPY INITIAL EVALUATION ADULT - ADDITIONAL COMMENTS
CT Head 8/12- Right 8.1 x 6.3 x 5.4 centers AP, TR, CC frontotemporal intraparenchymal, subarachnoid, and intraventricular hemorrhage. Right uncal, and subfalcine herniation, 2 cm leftward shift with trapped left lateral ventricle. Mass effect, with sulcal effacement, and decreased attenuation new ischemia not excluded. Intervertebral hemorrhage with hydrocephalus, effaced quadrigeminal plate, leftward midbrain displacement effacing the left ambient cistern and widening of the right ambient cistern.

## 2019-08-15 NOTE — DIETITIAN INITIAL EVALUATION ADULT. - PHYSICAL APPEARANCE
other (specify)/ht: 5 feet x inches, admit wt: xxx pounds, BMI: xx Kg/m2, IBW: xxx pounds (+/- 10%), x% IBW Edema: 2+ generalized  Skin: no pressure injuries noted per nursing flowsheet  Nutrition Focused Physical Exam: not appropriate at this time other (specify)/ht: 5 feet 2 inches, admit wt: 117 pounds, BMI: 21.4 Kg/m2, IBW: 118 pounds (+/- 10%), 99% IBW

## 2019-08-15 NOTE — DIETITIAN INITIAL EVALUATION ADULT. - REASON INDICATOR FOR ASSESSMENT
Nutrition Assessment warranted for length of stay on 8ICU.  Information obtained from: medical record  Per chart: "ICH with IVH , ICH score 4 PBD #4, COMA. Pt likely to fulfill brain death."

## 2019-08-15 NOTE — DIETITIAN INITIAL EVALUATION ADULT. - OTHER INFO
INFORMATION PTA  Diet PTA: unable to assess  Nutrition status PTA: unable to assess  Nutrition Supplements PTA: unknown  Food Allergies: NKFA  Weight History PTA: unable to assess  Other Information: Team unable to locate family. Ethics is following, assessing potential organ donor status.    INFORMATION THIS ADMISSION  Last BM: non noted  Other information:  Pt receiving free water 250ml q6 in setting of hypernatremia (Na 153).  Pt ordered for insulin drip in setting of hyperglycemia INFORMATION PTA  Diet PTA: unable to assess  Nutrition status PTA: unable to assess  Nutrition Supplements PTA: unknown  Food Allergies: NKFA  Weight History PTA: unable to assess  Other Information: Team attempting to contact family. Ethics is following, assessing potential organ donor status.    INFORMATION THIS ADMISSION  Last BM: non noted  Pt receiving free water 250ml q6 in setting of hypernatremia (Na 153).  Pt ordered for insulin drip in setting of hyperglycemia

## 2019-08-15 NOTE — PROGRESS NOTE ADULT - SUBJECTIVE AND OBJECTIVE BOX
HPI:  65 yo M hx CVA, HTN, HLD, afib on xarelto, dm2, transferred from [facility] for R cerebral intraparenchymal hemorrhage with midline shift. Patient resides at NH, last seen normal last night, found unresponsive in room this morning. Intubated at [facility]. Devastating ICH on initial CT. (12 Aug 2019 13:05)    On admission, the patient was:  GCS: 3  ICH score: 4      ROS: unable to obtain due to patients mental status     ICU Vital Signs Last 24 Hrs  T(C): 36.4 (15 Aug 2019 06:00), Max: 37.7 (14 Aug 2019 12:00)  T(F): 97.5 (15 Aug 2019 06:00), Max: 99.9 (14 Aug 2019 12:00)  HR: 69 (15 Aug 2019 07:15) (69 - 91)  BP: 114/65 (15 Aug 2019 07:15) (94/50 - 238/100)  BP(mean): 85 (15 Aug 2019 07:15) (65 - 143)  RR: 12 (15 Aug 2019 07:15) (12 - 13)  SpO2: 100% (15 Aug 2019 07:15) (100% - 100%)                            7.5    18.8  )-----------( 257      ( 14 Aug 2019 20:18 )             26.5   08-15    154<H>  |  126<H>  |  19  ----------------------------<  224<H>  3.4<L>   |  17<L>  |  3.10<H>    Ca    8.8      15 Aug 2019 02:02  Phos  4.6     08-14  Mg     2.4     08-14    TPro  6.3  /  Alb  3.3  /  TBili  0.4  /  DBili  x   /  AST  49<H>  /  ALT  26  /  AlkPhos  32<L>  08-13        LABS:                        8.0    17.7  )-----------( 228      ( 13 Aug 2019 23:03 )             28.0   08-14    162<HH>  |  >135<H>  |  23  ----------------------------<  154<H>  4.2   |  19<L>  |  2.33<H>    Ca    9.2      14 Aug 2019 00:06  Phos  0.8     08-14  Mg     3.0     08-14    TPro  6.3  /  Alb  3.3  /  TBili  0.4  /  DBili  x   /  AST  49<H>  /  ALT  26  /  AlkPhos  32<L>  08-13                 Imaging :   CT 8/12:   Right 8.1 x 6.3 x 5.4 centers AP, TR, CC frontotemporal intraparenchymal,   subarachnoid, and intraventricular hemorrhage. Right uncal, and   subfalcine herniation, 2 cm leftward shift with trapped left lateral   ventricle. Mass effect, with sulcal effacement, and decreased attenuation   new ischemia not excluded. Intervertebral hemorrhage with hydrocephalus,   effaced quadrigeminal plate, leftward midbrain displacement effacing the   left ambient cistern and widening of the right ambient cistern.    MEDICATIONS  (STANDING):  chlorhexidine 0.12% Liquid 15 milliLiter(s) Oral Mucosa every 12 hours  chlorhexidine 4% Liquid 1 Application(s) Topical <User Schedule>  dextrose 5%. 1000 milliLiter(s) (50 mL/Hr) IV Continuous <Continuous>  dextrose 5%. 1000 milliLiter(s) (100 mL/Hr) IV Continuous <Continuous>  docusate sodium 100 milliGRAM(s) Oral three times a day  insulin regular Infusion 2 Unit(s)/Hr (2 mL/Hr) IV Continuous <Continuous>  pantoprazole  Injectable 40 milliGRAM(s) IV Push daily  phenylephrine    Infusion 0.1 MICROgram(s)/kG/Min (1.988 mL/Hr) IV Continuous <Continuous>    MEDICATIONS  (PRN):  acetaminophen    Suspension .. 650 milliGRAM(s) Oral every 6 hours PRN Temp greater or equal to 38C (100.4F), Mild Pain (1 - 3)  glucagon  Injectable 1 milliGRAM(s) IntraMuscular once PRN Glucose LESS THAN 70 milligrams/deciliter  senna 2 Tablet(s) Oral at bedtime PRN Constipation        EXAMINATION:  General:  Coma - no sedative or analgesic given prior to exam  Neuro:  Intubated, L pupil 2 fixed, R pupil 3 fixed.   no gag, no cough, no dolls, no response on cold calorics no corneal, flaccid B/L UE and no movement LE to noxious stimuli  Cards:  RRR  Respiratory:  no respiratory distress  Abdomen  soft  Extremities:  no edema  Skin:  warm/dry HPI:  63 yo M hx CVA, HTN, HLD, afib on xarelto, dm2, transferred from [facility] for R cerebral intraparenchymal hemorrhage with midline shift. Patient resides at NH, last seen normal last night, found unresponsive in room this morning. Intubated at [facility]. Devastating ICH on initial CT. (12 Aug 2019 13:05)    On admission, the patient was:  GCS: 3  ICH score: 4      ROS: unable to obtain due to patients mental status     ICU Vital Signs Last 24 Hrs  T(C): 36.4 (15 Aug 2019 06:00), Max: 37.7 (14 Aug 2019 12:00)  T(F): 97.5 (15 Aug 2019 06:00), Max: 99.9 (14 Aug 2019 12:00)  HR: 69 (15 Aug 2019 07:15) (69 - 91)  BP: 114/65 (15 Aug 2019 07:15) (94/50 - 238/100)  BP(mean): 85 (15 Aug 2019 07:15) (65 - 143)  RR: 12 (15 Aug 2019 07:15) (12 - 13)  SpO2: 100% (15 Aug 2019 07:15) (100% - 100%)                            7.5    18.8  )-----------( 257      ( 14 Aug 2019 20:18 )             26.5   08-15    08-15    152<H>  |  122<H>  |  16  ----------------------------<  188<H>  3.4<L>   |  17<L>  |  3.16<H>    Ca    8.3<L>      15 Aug 2019 13:36  Phos  5.3     08-15  Mg     1.9     08-15    TPro  6.3  /  Alb  3.3  /  TBili  0.4  /  DBili  x   /  AST  49<H>  /  ALT  26  /  AlkPhos  32<L>  08-13      TPro  6.3  /  Alb  3.3  /  TBili  0.4  /  DBili  x   /  AST  49<H>  /  ALT  26  /  AlkPhos  32<L>  08-13        LABS:                        8.0    17.7  )-----------( 228      ( 13 Aug 2019 23:03 )             28.0   08-14    162<HH>  |  >135<H>  |  23  ----------------------------<  154<H>  4.2   |  19<L>  |  2.33<H>    Ca    9.2      14 Aug 2019 00:06  Phos  0.8     08-14  Mg     3.0     08-14    TPro  6.3  /  Alb  3.3  /  TBili  0.4  /  DBili  x   /  AST  49<H>  /  ALT  26  /  AlkPhos  32<L>  08-13      14 Aug 2019 07:01  -  15 Aug 2019 07:00  --------------------------------------------------------  IN:    dextrose 5%.: 2200 mL    Free Water: 1000 mL    insulin regular Infusion: 26.5 mL    phenylephrine   Infusion: 925.2 mL  Total IN: 4151.7 mL    OUT:    Indwelling Catheter - Urethral: 1915 mL  Total OUT: 1915 mL    Total NET: 2236.7 mL      15 Aug 2019 07:01  -  15 Aug 2019 17:15  --------------------------------------------------------  IN:    dextrose 5%.: 1000 mL    Free Water: 500 mL    insulin regular Infusion: 49 mL    phenylephrine   Infusion: 379.4 mL  Total IN: 1928.4 mL    OUT:    Indwelling Catheter - Urethral: 1080 mL  Total OUT: 1080 mL    Total NET: 848.4 mL                  Xray Chest 1 View- PORTABLE-Urgent (08.12.19 @ 18:03) >  Impression:    The heart is normal in size. The lungs are clear. Endotracheal tube is in   good position. An NG tube was placed and the tip is either in the antrum   of the stomach or the pylorus. The stomach appears to be markedly   distended. A loop recorder is overlying the left heart.      [facility] 8/12:   Right 8.1 x 6.3 x 5.4 centers AP, TR, CC frontotemporal intraparenchymal,   subarachnoid, and intraventricular hemorrhage. Right uncal, and   subfalcine herniation, 2 cm leftward shift with trapped left lateral   ventricle. Mass effect, with sulcal effacement, and decreased attenuation   new ischemia not excluded. Intervertebral hemorrhage with hydrocephalus,   effaced quadrigeminal plate, leftward midbrain displacement effacing the   left ambient cistern and widening of the right ambient cistern.    MEDICATIONS  (STANDING):  chlorhexidine 0.12% Liquid 15 milliLiter(s) Oral Mucosa every 12 hours  chlorhexidine 4% Liquid 1 Application(s) Topical <User Schedule>  dextrose 5%. 1000 milliLiter(s) (50 mL/Hr) IV Continuous <Continuous>  dextrose 5%. 1000 milliLiter(s) (100 mL/Hr) IV Continuous <Continuous>  docusate sodium 100 milliGRAM(s) Oral three times a day  insulin regular Infusion 2 Unit(s)/Hr (2 mL/Hr) IV Continuous <Continuous>  pantoprazole  Injectable 40 milliGRAM(s) IV Push daily  phenylephrine    Infusion 0.1 MICROgram(s)/kG/Min (1.988 mL/Hr) IV Continuous <Continuous>    MEDICATIONS  (PRN):  acetaminophen    Suspension .. 650 milliGRAM(s) Oral every 6 hours PRN Temp greater or equal to 38C (100.4F), Mild Pain (1 - 3)  glucagon  Injectable 1 milliGRAM(s) IntraMuscular once PRN Glucose LESS THAN 70 milligrams/deciliter  senna 2 Tablet(s) Oral at bedtime PRN Constipation        EXAMINATION:  General:  Coma - no sedative or analgesic given prior to exam  Neuro:  Intubated, L pupil 2 fixed, R pupil 3 fixed.   no gag, no cough, no dolls, no response on cold calorics no corneal, flaccid B/L UE and no movement LE to noxious stimuli  Cards:  RRR  Respiratory:  no respiratory distress  Abdomen  soft  Extremities:  no edema  Skin:  warm/dry

## 2019-08-15 NOTE — PROGRESS NOTE ADULT - ASSESSMENT
Summary:  ICH with IVH , ICH score 4 PBD #4, COMA    NEURO:  q1h neuro checks  No neurosurgical intervention in the setting of anticoagulation   Poor prognosis,   Attempted to contact  family and no response.   Palliative care consult- Pt likely to fulfill brain death will perform apnea test. Check ABG and ensure Temp > 36.5     CARDS:  SBP >100-150  Neosynephrine to maintain SBP >100    PULM:  sat > 92%  Intubated   PRVC - check ABG    RENAL:   Na 135-  <150    GASTRO:  advance as tolerated  Stress ulcer prophylaxis:  PPI    HEME:  monitor H/H    DVT prophylaxis: SCDs, hold anticoagulation, fresh heme     ENDO:  euglycemia    ID:  afebrile    Code status:  Full code  Disposition:  ICU    Time spent:  45 minutes Summary:  ICH with IVH , ICH score 4 PBD #4, COMA    NEURO:  q4h neuro checks  No neurosurgical intervention in the setting of anticoagulation   Poor prognosis,   Attempted to contact  family and no response. Police department are also trying to reach out to family. No response from any prospective family member s sionce admission. LIVE ON evaluated and closed case.  Palliative care consult- Pt likely to fulfill brain death will perform apnea test. Check ABG and ensure Temp > 36.5     CARDS:  SBP >100-150  Neosynephrine to maintain SBP >100    PULM:  sat > 92%  Intubated   PRVC - check ABG    RENAL:   Na 135-  <150    GASTRO:  advance as tolerated  Stress ulcer prophylaxis:  PPI    HEME:  monitor H/H    DVT prophylaxis: SCDs, hold anticoagulation, fresh heme     ENDO:  Hyperglycemia   Insulin drip    ID:  afebrile    Code status:  Full code  Disposition:  ICU    Time spent:  45 minutes

## 2019-08-15 NOTE — PROVIDER CONTACT NOTE (OTHER) - REASON
Patient's last two consecutive serum glucose was >200
Repeat blood glucose 87
UO low - 15cc for last hour
FS 92, patient on insulin drip

## 2019-08-15 NOTE — DIETITIAN INITIAL EVALUATION ADULT. - PERTINENT MEDS FT
MEDICATIONS  (STANDING):  chlorhexidine 0.12% Liquid 15 milliLiter(s) Oral Mucosa every 12 hours  chlorhexidine 4% Liquid 1 Application(s) Topical <User Schedule>  dextrose 5%. 1000 milliLiter(s) (50 mL/Hr) IV Continuous <Continuous>  dextrose 5%. 1000 milliLiter(s) (100 mL/Hr) IV Continuous <Continuous>  docusate sodium 100 milliGRAM(s) Oral three times a day  insulin regular Infusion 2 Unit(s)/Hr (2 mL/Hr) IV Continuous <Continuous>  pantoprazole  Injectable 40 milliGRAM(s) IV Push daily  phenylephrine    Infusion 0.1 MICROgram(s)/kG/Min (1.988 mL/Hr) IV Continuous <Continuous>    MEDICATIONS  (PRN):  acetaminophen    Suspension .. 650 milliGRAM(s) Oral every 6 hours PRN Temp greater or equal to 38C (100.4F), Mild Pain (1 - 3)  glucagon  Injectable 1 milliGRAM(s) IntraMuscular once PRN Glucose LESS THAN 70 milligrams/deciliter  senna 2 Tablet(s) Oral at bedtime PRN Constipation

## 2019-08-15 NOTE — CHART NOTE - NSCHARTNOTEFT_GEN_A_CORE
Chart reviewed, attempts being made by SW to reach NOK.  Will continue to follow, if NOK reached, please contact palliative team for assistance with any GOC discussions.  please call with questions

## 2019-08-15 NOTE — PROGRESS NOTE ADULT - ASSESSMENT
ASSESSMENT/PLAN:  ICH with IVH , ICH score 4 PBD #5, COMA    - neurocheck q 4hr  - Pall care  - Brain death exam once labs normalize   - Neosynephrine to maintain SBP >100      Time spent: 35 [x] critical care minutes    Contact: [phone] Statement Selected

## 2019-08-15 NOTE — DIETITIAN INITIAL EVALUATION ADULT. - PERTINENT LABORATORY DATA
08-15 @ 08:19: Sodium 153<H>, Potassium 4.0, Chloride 123<H>, Calcium 8.3<L>, Magnesium 2.1, Phosphorus 5.4<H>, BUN 18, Creatinine 3.06<H>, <H>  08-15 @ 02:02: Sodium 154<H>, Potassium 3.4<L>, Chloride 126<H>, Calcium 8.8, Magnesium --, Phosphorus --, BUN 19, Creatinine 3.10<H>, <H> 08-15 @ 08:19: Sodium 153<H>, Potassium 4.0, Chloride 123<H>, Calcium 8.3<L>, Magnesium 2.1, Phosphorus 5.4<H>, BUN 18, Creatinine 3.06<H>, <H>  08-15 @ 02:02: Sodium 154<H>, Potassium 3.4<L>, Chloride 126<H>, Calcium 8.8, Magnesium --, Phosphorus --, BUN 19, Creatinine 3.10<H>, <H>  HbA1c 6.1%  POCT blood glucose x 24 hours: 164-237mg/dL

## 2019-08-15 NOTE — PROVIDER CONTACT NOTE (OTHER) - ACTION/TREATMENT ORDERED:
PA aware, insulin drip stopped at this time, will reassess blood glucose in 15minutes as per ICU insulin infusion protocol, will continue to monitor.
PA aware of blood glucose, continue to hold insulin drip, will follow ICU insulin protocol and will assess blood glucose in 1hour, will continue to monitor.
10mg Lasix IVP; Continue to monitor UO hourly; Maintain IVF and q6 BMPs
Q6h sliding scale ordered.

## 2019-08-15 NOTE — PROVIDER CONTACT NOTE (OTHER) - SITUATION
Patient blood glucose is 92, patient currently on insulin drip at 5.5units/hr.
Patient's last two consecutive serum glucose was >200
Repeat blood glucose is 87, insulin drip stopped at 2300 since previous blood glucose was 92.
UO low - 15cc for last hour

## 2019-08-15 NOTE — PROVIDER CONTACT NOTE (OTHER) - ASSESSMENT
Blood glucose 92, no change in status  noted.
Blood glucose 87, no distress noted.
Patient semicomatose, extension to pain B/L UE and flexes to pain B/L LE. Pupils 2mm round and fixed
UO low - 15cc for last hour. D5W IVF at 100cc/hr in progress. Misael gtt currently at 2mcg/kg/hr and titrated for SBP > 100. Pt remains unresponsive with GCS of 3.

## 2019-08-15 NOTE — PROGRESS NOTE ADULT - SUBJECTIVE AND OBJECTIVE BOX
Daytime Events:   on insulin gtt  sodium improved     VITAL SIGNS:  Reviewed   IMAGING: Reviewed  MEDICATIONS:  Reviewed     Physical Exam:  General:  Coma - no sedative or analgesic given prior to exam  Neuro:  Intubated, L pupil 2 fixed, R pupil 3 fixed.   no gag, no cough, no dolls, no response on cold calorics no corneal, flaccid B/L UE and no movement LE to noxious stimuli  Cards:  RRR  Respiratory:  no respiratory distress  Abdomen  soft  Extremities:  no edema  Skin:  warm/dry

## 2019-08-16 NOTE — PROGRESS NOTE ADULT - PROBLEM SELECTOR PLAN 5
recommend:  Prior to extubation- ativan, dilaudid and glycopyrroalte x1  medications ordered for symptom management post extubation as follows:  dilaudid 0.5mg [id number] prn for pain/dyspnea  ativan 0.5mg q1h prn for anxiety/agitation  glycopyrrolate 0.4mg q6h prn for secretions.  Discussed with RN, 8ICU SW, NSCU team and family.

## 2019-08-16 NOTE — DISCHARGE NOTE FOR THE EXPIRED PATIENT - HOSPITAL COURSE
64 year old male with PMHx of CVA, HTN, HLD, afib on xarelto, DM2, transferred from [facility] for R cerebral intraparenchymal hemorrhage with midline shift. Patient resides at Nursing Home, last seen normal night before admission. Found unresponsive in room. Intubated at [facility]. Devastating ICH on initial CT.  Patient was admitted and monitored in the surgery ICU under neurosurgical care.  Patient had dismal exam and family was unable to be reached. Serial CT scans done with large but stable intraparenchymal hemorrhage. Patient was made DNR with 2PC consent.  On 8/16 sister was spoken to by neurosurgery and palliative care and decision made to terminally extubate patient. Patient extubated on 13:22 and pronounced dead at 13:49 based on cardiopulmonary criteria. Sister made aware.

## 2019-08-16 NOTE — PROGRESS NOTE ADULT - PROBLEM SELECTOR PLAN 1
Catastrophic bleed  No surgical interventions  Herniation noted on CT.
Catastrophic bleed  No surgical interventions  Herniation noted on CT.
Catastrophic bleed  No surgical interventions  Herniation noted on CT.  not declared brain dead

## 2019-08-16 NOTE — PROGRESS NOTE ADULT - SUBJECTIVE AND OBJECTIVE BOX
SUBJECTIVE AND OBJECTIVE:  INTERVAL HPI/OVERNIGHT EVENTS:    DNR on chart: Yes    Allergies    Allergy Status Unknown    Intolerances    MEDICATIONS  (STANDING):  chlorhexidine 0.12% Liquid 15 milliLiter(s) Oral Mucosa every 12 hours  chlorhexidine 4% Liquid 1 Application(s) Topical <User Schedule>  dextrose 5%. 1000 milliLiter(s) (50 mL/Hr) IV Continuous <Continuous>  docusate sodium 100 milliGRAM(s) Oral three times a day  multiple electrolytes Injection Type 1 1000 milliLiter(s) (100 mL/Hr) IV Continuous <Continuous>  pantoprazole  Injectable 40 milliGRAM(s) IV Push daily    MEDICATIONS  (PRN):  acetaminophen    Suspension .. 650 milliGRAM(s) Oral every 6 hours PRN Temp greater or equal to 38C (100.4F), Mild Pain (1 - 3)  glucagon  Injectable 1 milliGRAM(s) IntraMuscular once PRN Glucose LESS THAN 70 milligrams/deciliter  senna 2 Tablet(s) Oral at bedtime PRN Constipation      ITEMS UNCHECKED ARE NOT PRESENT    PRESENT SYMPTOMS: [ ]Unable to obtain due to poor mentation   Source if other than patient:  [ ]Family   [ ]Team     Pain (Impact on QOL):    Location:  Minimal acceptable level (0-10 scale):                   Aggravating factors:  Quality:  Radiation:  Severity (0-10 scale):    Timing:    Dyspnea:                           [ ]Mild [ ]Moderate [ ]Severe  Anxiety:                             [ ]Mild [ ]Moderate [ ]Severe  Fatigue:                             [ ]Mild [ ]Moderate [ ]Severe  Nausea:                             [ ]Mild [ ]Moderate [ ]Severe  Loss of appetite:              [ ]Mild [ ]Moderate [ ]Severe  Constipation:                    [ ]Mild [ ]Moderate [ ]Severe    PAIN AD Score:	  http://geriatrictoolkit.[place].[email or web address]/cog/painad.pdf (Ctrl + left click to view)    Other Symptoms:  [ ]All other review of systems negative     Karnofsky Performance Score/Palliative Performance Status Version 2:         %    http://palliative.info/resource_material/PPSv2.pdf  PHYSICAL EXAM:  Vital Signs Last 24 Hrs  T(C): 37.3 (16 Aug 2019 11:00), Max: 37.4 (15 Aug 2019 22:00)  T(F): 99.1 (16 Aug 2019 11:00), Max: 99.3 (15 Aug 2019 22:00)  HR: 83 (16 Aug 2019 12:00) (68 - 97)  BP: 110/59 (16 Aug 2019 12:00) (83/50 - 217/102)  BP(mean): 80 (16 Aug 2019 12:00) (60 - 144)  RR: 12 (16 Aug 2019 12:00) (12 - 17)  SpO2: 100% (16 Aug 2019 12:00) (100% - 100%) I&O's Summary    15 Aug 2019 07:01  -  16 Aug 2019 07:00  --------------------------------------------------------  IN: 4480.4 mL / OUT: 2780 mL / NET: 1700.4 mL    16 Aug 2019 07:01  -  16 Aug 2019 12:04  --------------------------------------------------------  IN: 809.2 mL / OUT: 450 mL / NET: 359.2 mL     GENERAL:  [ ]Alert  [ ]Oriented x   [ ]Lethargic  [ ]Cachexia  [ ]Unarousable  [ ]Verbal  [ ]Non-Verbal  Behavioral:   [ ] Anxiety  [ ] Delirium [ ] Agitation [ ] Other  HEENT:  [ ]Normal   [ ]Dry mouth   [ ]ET Tube/Trach  [ ]Oral lesions  PULMONARY:   [ ]Clear [ ]Tachypnea  [ ]Audible excessive secretions   [ ]Rhonchi        [ ]Right [ ]Left [ ]Bilateral  [ ]Crackles        [ ]Right [ ]Left [ ]Bilateral  [ ]Wheezing     [ ]Right [ ]Left [ ]Bilateral  CARDIOVASCULAR:    [ ]Regular [ ]Irregular [ ]Tachy  [ ]Rodolfo [ ]Murmur [ ]Other  GASTROINTESTINAL:  [ ]Soft  [ ]Distended   [ ]+BS  [ ]Non tender [ ]Tender  [ ]PEG [ ]OGT/ NGT   Last BM:    GENITOURINARY:  [ ]Normal [ ] Incontinent   [ ]Oliguria/Anuria   [ ]Loaiza  MUSCULOSKELETAL:   [ ]Normal   [ ]Weakness  [ ]Bed/Wheelchair bound [ ]Edema  NEUROLOGIC:   [ ]No focal deficits  [ ] Cognitive impairment  [ ] Dysphagia [ ]Dysarthria [ ] Paresis [ ]Other   SKIN:   [ ]Normal   [ ]Pressure ulcer(s)  [ ]Rash    CRITICAL CARE:  [ ] Shock Present  [ ]Septic [ ]Cardiogenic [ ]Neurologic [ ]Hypovolemic  [ ]  Vasopressors [ ]  Inotropes   [ ] Respiratory failure present  [ ] Acute  [ ] Chronic [ ] Hypoxic  [ ] Hypercarbic [ ] Other  [ ] Other organ failure     LABS:                        7.6    14.7  )-----------( 206      ( 16 Aug 2019 01:22 )             24.9   08-16    149<H>  |  121<H>  |  14  ----------------------------<  159<H>  5.6<H>   |  16<L>  |  3.27<H>    Ca    7.8<L>      16 Aug 2019 10:10  Phos  4.7     08-16  Mg     2.7     08-16          RADIOLOGY & ADDITIONAL STUDIES:    Protein Calorie Malnutrition Present: [ ] yes [ ] no  [ ] PPSV2 < or = 30%  [ ] significant weight loss [ ] poor nutritional intake [ ] anasarca [ ] catabolic state Albumin, Serum: 3.3 g/dL (08-13-19 @ 21:59)  Artificial Nutrition [ ]     REFERRALS:   [ ]Chaplaincy  [ ] Hospice  [ ]Child Life  [ ]Social Work  [ ]Case management [ ]Holistic Therapy   Goals of Care Document: SUBJECTIVE AND OBJECTIVE: patient seen and evaluated.  Contact made with next of kin regarding medical situation. NSCU team updated Francisca upon arrival of devastating neurological injury and poor prognosis for meaningful recovery.  This provider along with 8ICU SW met with Francisca separately to assess her understanding and next steps. We agreed upon DNR/DNI and comfort measures only. Call made to [facility] as they were previously involved, case # [id number]. Per representative Ravi, case will again be closed. Plan for elective extubation today per discussion with sister Francisca. Emotional support provided. She does not want to be present for elective extubation. Discussed with RN, neurosurgical team and SW.    Medications ordered for symptom management, dilaudid, ativan and glycopyrrolate x1 prior to extubation. medications ordered for symptom management post extubation.  New MOLST completed with next of kin as surrogate decision maker.     INTERVAL HPI/OVERNIGHT EVENTS: no acute overnight events. surrogate decision maker able to be contacted, sister Francisca.     DNR on chart: Yes    Allergies    Allergy Status Unknown    Intolerances    MEDICATIONS  (STANDING):  chlorhexidine 0.12% Liquid 15 milliLiter(s) Oral Mucosa every 12 hours  chlorhexidine 4% Liquid 1 Application(s) Topical <User Schedule>  dextrose 5%. 1000 milliLiter(s) (50 mL/Hr) IV Continuous <Continuous>  docusate sodium 100 milliGRAM(s) Oral three times a day  multiple electrolytes Injection Type 1 1000 milliLiter(s) (100 mL/Hr) IV Continuous <Continuous>  pantoprazole  Injectable 40 milliGRAM(s) IV Push daily    MEDICATIONS  (PRN):  acetaminophen    Suspension .. 650 milliGRAM(s) Oral every 6 hours PRN Temp greater or equal to 38C (100.4F), Mild Pain (1 - 3)  glucagon  Injectable 1 milliGRAM(s) IntraMuscular once PRN Glucose LESS THAN 70 milligrams/deciliter  senna 2 Tablet(s) Oral at bedtime PRN Constipation      ITEMS UNCHECKED ARE NOT PRESENT    PRESENT SYMPTOMS: [x ]Unable to obtain due to poor mentation   Source if other than patient:  [ ]Family   [ ]Team     Pain (Impact on QOL):    Location:  Minimal acceptable level (0-10 scale):                   Aggravating factors:  Quality:  Radiation:  Severity (0-10 scale):    Timing:    Dyspnea:                           [ ]Mild [ ]Moderate [ ]Severe  Anxiety:                             [ ]Mild [ ]Moderate [ ]Severe  Fatigue:                             [ ]Mild [ ]Moderate [ ]Severe  Nausea:                             [ ]Mild [ ]Moderate [ ]Severe  Loss of appetite:              [ ]Mild [ ]Moderate [ ]Severe  Constipation:                    [ ]Mild [ ]Moderate [ ]Severe    PAIN AD Score:	0  [email or web address]/cog/painad.pdf (Ctrl + left click to view)    Other Symptoms:  [ ]All other review of systems negative     Karnofsky Performance Score/Palliative Performance Status Version 2:      10   %    http://palliative.info/resource_material/PPSv2.pdf  PHYSICAL EXAM:  Vital Signs Last 24 Hrs  T(C): 37.3 (16 Aug 2019 11:00), Max: 37.4 (15 Aug 2019 22:00)  T(F): 99.1 (16 Aug 2019 11:00), Max: 99.3 (15 Aug 2019 22:00)  HR: 83 (16 Aug 2019 12:00) (68 - 97)  BP: 110/59 (16 Aug 2019 12:00) (83/50 - 217/102)  BP(mean): 80 (16 Aug 2019 12:00) (60 - 144)  RR: 12 (16 Aug 2019 12:00) (12 - 17)  SpO2: 100% (16 Aug 2019 12:00) (100% - 100%) I&O's Summary    15 Aug 2019 07:01  -  16 Aug 2019 07:00  --------------------------------------------------------  IN: 4480.4 mL / OUT: 2780 mL / NET: 1700.4 mL    16 Aug 2019 07:01  -  16 Aug 2019 12:04  --------------------------------------------------------  IN: 809.2 mL / OUT: 450 mL / NET: 359.2 mL     GENERAL:  [ ]Alert  [ ]Oriented x   [ ]Lethargic  [ ]Cachexia  [ x]Unarousable  [ ]Verbal  [ ]Non-Verbal  Behavioral:   [ ] Anxiety  [ ] Delirium [ ] Agitation [ ] Other  HEENT:  [ ]Normal   [ ]Dry mouth   [x ]ET Tube/Trach  [ ]Oral lesions  PULMONARY:   [x ]Clear [ ]Tachypnea  [ ]Audible excessive secretions   [ ]Rhonchi        [ ]Right [ ]Left [ ]Bilateral  [ ]Crackles        [ ]Right [ ]Left [ ]Bilateral  [ ]Wheezing     [ ]Right [ ]Left [ ]Bilateral  CARDIOVASCULAR:    [x ]Regular [ ]Irregular [ ]Tachy  [ ]Rodolfo [ ]Murmur [ ]Other  GASTROINTESTINAL:  [ x]Soft  [ ]Distended   [ ]+BS  [x ]Non tender [ ]Tender  [ ]PEG [x ]OGT/ NGT   Last BM:    GENITOURINARY:  [ ]Normal [ ] Incontinent   [ ]Oliguria/Anuria   [ x]Loaiza  MUSCULOSKELETAL:   [ ]Normal   [ ]Weakness  [ x]Bed/Wheelchair bound [ ]Edema  NEUROLOGIC: unresponsive  [ ]No focal deficits  [ ] Cognitive impairment  [ ] Dysphagia [ ]Dysarthria [ ] Paresis [ ]Other   SKIN: ecchymoses  [ ]Normal   [ ]Pressure ulcer(s)  [ ]Rash    CRITICAL CARE:  [x ] Shock Present  [ ]Septic [ ]Cardiogenic [ ]Neurologic [ ]Hypovolemic  [x ]  Vasopressors [ ]  Inotropes   [x ] Respiratory failure present  [x ] Acute  [ ] Chronic [ ] Hypoxic  [ ] Hypercarbic [ ] Other  [ ] Other organ failure     LABS:                        7.6    14.7  )-----------( 206      ( 16 Aug 2019 01:22 )             24.9   08-16    149<H>  |  121<H>  |  14  ----------------------------<  159<H>  5.6<H>   |  16<L>  |  3.27<H>    Ca    7.8<L>      16 Aug 2019 10:10  Phos  4.7     08-16  Mg     2.7     08-16    RADIOLOGY & ADDITIONAL STUDIES: no new imaging studies for review    Protein Calorie Malnutrition Present: [x ] yes [ ] no  [ x] PPSV2 < or = 30%  [ ] significant weight loss [ x] poor nutritional intake [ ] anasarca [ ] catabolic state Albumin, Serum: 3.3 g/dL (08-13-19 @ 21:59)  Artificial Nutrition [x ]     REFERRALS:   [ ]Chaplaincy  [ ] Hospice  [ ]Child Life  [x ]Social Work  [ ]Case management [ ]Holistic Therapy   Goals of Care Document:

## 2019-08-16 NOTE — CHART NOTE - NSCHARTNOTEFT_GEN_A_CORE
Contact made with patients Francisca ALFRED [phone]  States patient does not have any other family besides her  Offered to discuss medical information but Francisca prefers to come to hospital to speak with doctors.  States she is waiting for a ride, planning to leave in next 1/2 hour, traveling from [place].  Contact information provided. SW updated.

## 2019-08-16 NOTE — PROGRESS NOTE ADULT - ASSESSMENT
63 Y/O PMH as described above admitted after being found unresponsive in the nursing home, transferred to [facility] from [facility] with  right cerebral ICH   grade 4 , midline shift, called for goals of care

## 2019-08-16 NOTE — PROGRESS NOTE ADULT - ASSESSMENT
Summary:  ICH with IVH , ICH score 4 PBD #5, COMA    NEURO:  q4h neuro checks  No neurosurgical intervention in the setting of anticoagulation   Poor prognosis,   Attempted to contact  family and no response. Police department are also trying to reach out to family. No response from any prospective family member s sionce admission. [facility] evaluated and closed case.  Palliative care consult- Pt likely to fulfill brain death will perform apnea test. Check ABG and ensure Temp > 36.5     CARDS:  SBP >100-150  Neosynephrine to maintain SBP >100    PULM:  sat > 92%  Intubated   PRVC - check ABG    RENAL:   Na 135-  <150    GASTRO:  advance as tolerated  Stress ulcer prophylaxis:  PPI    HEME:  monitor H/H    DVT prophylaxis: SCDs, hold anticoagulation, fresh heme     ENDO:  Hyperglycemia   Insulin drip    ID:  afebrile    Code status:  Full code  Disposition:  ICU    Time spent:  45 minutes

## 2019-08-16 NOTE — PROGRESS NOTE ADULT - PROBLEM SELECTOR PLAN 3
KPS 10% no likelihood for any neurological or functional recovery

## 2019-08-16 NOTE — AIRWAY REMOVAL NOTE  ADULT & PEDS - ARTIFICAL AIRWAY REMOVAL COMMENTS
Written order for extubation verified. The patient was identified by full name and birth date compared to the identification band. Present during the procedure was Juanito Sultana RN.

## 2019-08-16 NOTE — PROGRESS NOTE ADULT - SUBJECTIVE AND OBJECTIVE BOX
After discussion of GOC with patient's sister Francisca Scales, she agreed to companionate extubation and withdrawal of care.   Extubation was done at 13:49 under supervision of palliative care team.   at 13:22 , no spontaneous breathing was appreciated, No heart sounds ausculted and patient had no palpable pulses.   The patient was pronounced dead at 13:22 by me . His sister was notified. Family did not want autopsy.       Antoni Kendrick MD  Neurocritical Care Fellow After discussion of GOC with patient's sister Francisca Scales, she agreed to companionate extubation and withdrawal of care.   Extubation was done at 13:22 under supervision of palliative care team.   At 13:49 , no spontaneous breathing was appreciated, No heart sounds ausculted and patient had no palpable pulses. ECG was flat line.   The patient was pronounced dead at 13:49 by me . His sister was notified. Autopsy offered and family declined.       Antoni Kendrick MD  Neurocritical Care Fellow

## 2019-08-16 NOTE — PROGRESS NOTE ADULT - PROBLEM SELECTOR PLAN 2
Intubated, management per NSCU
Intubated, management per NSCU
intubated  plan for elective extubation today after discussion with sister, AUBRIE Espinosa and surrogate  ELANA completed

## 2019-08-16 NOTE — PROGRESS NOTE ADULT - SUBJECTIVE AND OBJECTIVE BOX
HPI:  63 yo M hx CVA, HTN, HLD, afib on xarelto, dm2, transferred from [facility] for R cerebral intraparenchymal hemorrhage with midline shift. Patient resides at NH, last seen normal last night, found unresponsive in room this morning. Intubated at [facility]. Devastating ICH on initial CT. (12 Aug 2019 13:05)    On admission, the patient was:  GCS: 3  ICH score: 4      ROS: unable to obtain due to patients mental status     ICU Vital Signs Last 24 Hrs  T(C): 36.5 (16 Aug 2019 07:00), Max: 37.4 (15 Aug 2019 22:00)  T(F): 97.7 (16 Aug 2019 07:00), Max: 99.3 (15 Aug 2019 22:00)  HR: 84 (16 Aug 2019 07:10) (68 - 97)  BP: 116/59 (16 Aug 2019 07:10) (83/50 - 217/102)  BP(mean): 83 (16 Aug 2019 07:10) (60 - 144)  RR: 12 (16 Aug 2019 07:10) (12 - 17)  SpO2: 100% (16 Aug 2019 07:10) (100% - 100%)                              7.6    14.7  )-----------( 206      ( 16 Aug 2019 01:22 )             24.9   08-16    149<H>  |  127<H>  |  10  ----------------------------<  97  4.4   |  11<L>  |  2.41<H>    Ca    5.9<LL>      16 Aug 2019 04:15  Phos  5.8     08-16  Mg     2.9     08-16                         7.5    18.8  )-----------( 257      ( 14 Aug 2019 20:18 )             26.5   08-15    08-15    152<H>  |  122<H>  |  16  ----------------------------<  188<H>  3.4<L>   |  17<L>  |  3.16<H>    Ca    8.3<L>      15 Aug 2019 13:36  Phos  5.3     08-15  Mg     1.9     08-15    TPro  6.3  /  Alb  3.3  /  TBili  0.4  /  DBili  x   /  AST  49<H>  /  ALT  26  /  AlkPhos  32<L>  08-13      TPro  6.3  /  Alb  3.3  /  TBili  0.4  /  DBili  x   /  AST  49<H>  /  ALT  26  /  AlkPhos  32<L>  08-13        15 Aug 2019 07:01  -  15 Aug 2019 17:15  --------------------------------------------------------  IN:    dextrose 5%.: 1000 mL    Free Water: 500 mL    insulin regular Infusion: 49 mL    phenylephrine   Infusion: 379.4 mL  Total IN: 1928.4 mL    OUT:    Indwelling Catheter - Urethral: 1080 mL  Total OUT: 1080 mL    Total NET: 848.4 mL                  Xray Chest 1 View- PORTABLE-Urgent (08.12.19 @ 18:03) >  Impression:    The heart is normal in size. The lungs are clear. Endotracheal tube is in   good position. An NG tube was placed and the tip is either in the antrum   of the stomach or the pylorus. The stomach appears to be markedly   distended. A loop recorder is overlying the left heart.      CTH 8/12:   Right 8.1 x 6.3 x 5.4 centers AP, TR, CC frontotemporal intraparenchymal,   subarachnoid, and intraventricular hemorrhage. Right uncal, and   subfalcine herniation, 2 cm leftward shift with trapped left lateral   ventricle. Mass effect, with sulcal effacement, and decreased attenuation   new ischemia not excluded. Intervertebral hemorrhage with hydrocephalus,   effaced quadrigeminal plate, leftward midbrain displacement effacing the   left ambient cistern and widening of the right ambient cistern.    MEDICATIONS  (STANDING):  chlorhexidine 0.12% Liquid 15 milliLiter(s) Oral Mucosa every 12 hours  chlorhexidine 4% Liquid 1 Application(s) Topical <User Schedule>  dextrose 5%. 1000 milliLiter(s) (50 mL/Hr) IV Continuous <Continuous>  docusate sodium 100 milliGRAM(s) Oral three times a day  insulin regular Infusion 2 Unit(s)/Hr (2 mL/Hr) IV Continuous <Continuous>  multiple electrolytes Injection Type 1 1000 milliLiter(s) (100 mL/Hr) IV Continuous <Continuous>  pantoprazole  Injectable 40 milliGRAM(s) IV Push daily  phenylephrine    Infusion 0.1 MICROgram(s)/kG/Min (1.988 mL/Hr) IV Continuous <Continuous>    MEDICATIONS  (PRN):  acetaminophen    Suspension .. 650 milliGRAM(s) Oral every 6 hours PRN Temp greater or equal to 38C (100.4F), Mild Pain (1 - 3)  glucagon  Injectable 1 milliGRAM(s) IntraMuscular once PRN Glucose LESS THAN 70 milligrams/deciliter  senna 2 Tablet(s) Oral at bedtime PRN Constipation        EXAMINATION:  General:  Coma - no sedative or analgesic given prior to exam  Neuro:  Intubated, L pupil 2 fixed, R pupil 3 fixed.   no gag, no cough, no dolls, no response on cold calorics no corneal, flaccid B/L UE and no movement LE to noxious stimuli  Cards:  RRR  Respiratory:  no respiratory distress  Abdomen  soft  Extremities:  no edema  Skin:  warm/dry

## 2019-08-16 NOTE — PROGRESS NOTE ADULT - PROBLEM SELECTOR PLAN 4
: Carline and this writer continue to leave messages to patients sister : Yanely Redman:  [phone], only known next of kin per NH papers.  No one has returned any calls. .    continues to pursue information regarding next of kin. NH provided address of sister Francisca Redman/ Police notified to do a "wellness check" on Ms. Redman,  a letter will also be sent to Ms Redman.  Of note:  [facility] :  "Administrative consent" for organ donation if no next of kin.  Please refer to policy 700.03 located in front of chart which states if there is no family ,next level of surrogate  is guardianship.  Pt may progress to brain death in the interim.   Ethics consulted and will f/u with a formal consult.  Legal has been called , no return call.  Due to patients catastrophic bleed and poor neurological status, a  2 PC DNR has been completed on 8/13 and documented accordingly.
: Carline and this writer continue to leave messages to patients sister : Yanely Redman:  [phone], only known next of kin per NH papers.  No one has returned any calls. NH shares that Ms. Redman often takes a few days to return calls.   Due to patients catastrophic bleed and poor neurological status, Dr Dawkins agrees that a DNR is an appropriate measure as we await patients sister to return call and /or location of any other next of kin.  A  2 PC DNR has been completed and documented accordingly
>46 minutes spent on ACP  MOLST recompleted as contact made with NOK.  Sister Francisca, surrogate decision maker. DNR/DNI, comfort measures only  compassionate extubation today

## 2021-05-04 NOTE — PROGRESS NOTE ADULT - PROBLEM SELECTOR PROBLEM 3
Pt ambulatory to restroom without difficulties. Pt states, \"I'm feeling much better.\"  
Functional quadriplegia

## 2022-10-13 NOTE — CONSULT NOTE ADULT - CONSULT REASON
To assist in the ethical dilemma of a 64 year old male without surrogate in whom [facility] [place] would like to garcias consent for organ donation
GOALS OF CARE
ICH
Labs/EKG/Imaging Studies
